# Patient Record
Sex: MALE | Race: WHITE | NOT HISPANIC OR LATINO
[De-identification: names, ages, dates, MRNs, and addresses within clinical notes are randomized per-mention and may not be internally consistent; named-entity substitution may affect disease eponyms.]

---

## 2017-01-11 ENCOUNTER — MEDICATION RENEWAL (OUTPATIENT)
Age: 82
End: 2017-01-11

## 2017-01-12 ENCOUNTER — APPOINTMENT (OUTPATIENT)
Dept: OPHTHALMOLOGY | Facility: CLINIC | Age: 82
End: 2017-01-12

## 2017-01-25 ENCOUNTER — MEDICATION RENEWAL (OUTPATIENT)
Age: 82
End: 2017-01-25

## 2017-04-13 ENCOUNTER — APPOINTMENT (OUTPATIENT)
Dept: OPHTHALMOLOGY | Facility: CLINIC | Age: 82
End: 2017-04-13

## 2017-04-17 ENCOUNTER — APPOINTMENT (OUTPATIENT)
Dept: INTERNAL MEDICINE | Facility: CLINIC | Age: 82
End: 2017-04-17

## 2017-04-17 VITALS
OXYGEN SATURATION: 93 % | TEMPERATURE: 97.9 F | WEIGHT: 182 LBS | HEART RATE: 73 BPM | HEIGHT: 67 IN | BODY MASS INDEX: 28.56 KG/M2

## 2017-04-17 DIAGNOSIS — S67.192A CRUSHING INJURY OF RIGHT MIDDLE FINGER, INITIAL ENCOUNTER: ICD-10-CM

## 2017-04-17 DIAGNOSIS — S67.194A: ICD-10-CM

## 2017-04-17 DIAGNOSIS — S62.634A DISPLACED FRACTURE OF DISTAL PHALANX OF RIGHT RING FINGER, INITIAL ENCOUNTER FOR CLOSED FRACTURE: ICD-10-CM

## 2017-04-17 DIAGNOSIS — S62.632A DISPLACED FRACTURE OF DISTAL PHALANX OF RIGHT MIDDLE FINGER, INITIAL ENCOUNTER FOR CLOSED FRACTURE: ICD-10-CM

## 2017-04-17 LAB
CREAT SPEC-SCNC: NORMAL
GLUCOSE UR-MCNC: NORMAL
HGB UR QL STRIP.AUTO: NORMAL
KETONES UR-MCNC: NORMAL
LEUKOCYTE ESTERASE UR QL STRIP: NORMAL
NITRITE UR QL STRIP: NORMAL
PH UR STRIP: 7
PROT UR STRIP-MCNC: NORMAL
SP GR UR STRIP: 1.02

## 2017-04-18 ENCOUNTER — TRANSCRIPTION ENCOUNTER (OUTPATIENT)
Age: 82
End: 2017-04-18

## 2017-06-14 ENCOUNTER — MEDICATION RENEWAL (OUTPATIENT)
Age: 82
End: 2017-06-14

## 2017-08-03 ENCOUNTER — APPOINTMENT (OUTPATIENT)
Dept: OPHTHALMOLOGY | Facility: CLINIC | Age: 82
End: 2017-08-03
Payer: MEDICARE

## 2017-08-03 PROCEDURE — 92012 INTRM OPH EXAM EST PATIENT: CPT

## 2017-11-16 ENCOUNTER — RX RENEWAL (OUTPATIENT)
Age: 82
End: 2017-11-16

## 2017-11-16 ENCOUNTER — APPOINTMENT (OUTPATIENT)
Dept: OPHTHALMOLOGY | Facility: CLINIC | Age: 82
End: 2017-11-16
Payer: MEDICARE

## 2017-11-16 PROCEDURE — 92012 INTRM OPH EXAM EST PATIENT: CPT

## 2017-12-07 ENCOUNTER — APPOINTMENT (OUTPATIENT)
Dept: ORTHOPEDIC SURGERY | Facility: CLINIC | Age: 82
End: 2017-12-07
Payer: MEDICARE

## 2017-12-07 PROCEDURE — 99203 OFFICE O/P NEW LOW 30 MIN: CPT | Mod: 25

## 2017-12-07 PROCEDURE — 20610 DRAIN/INJ JOINT/BURSA W/O US: CPT | Mod: RT

## 2018-02-05 ENCOUNTER — APPOINTMENT (OUTPATIENT)
Dept: ORTHOPEDIC SURGERY | Facility: CLINIC | Age: 83
End: 2018-02-05
Payer: MEDICARE

## 2018-02-05 PROCEDURE — 20610 DRAIN/INJ JOINT/BURSA W/O US: CPT | Mod: RT

## 2018-02-05 PROCEDURE — 99214 OFFICE O/P EST MOD 30 MIN: CPT | Mod: 25

## 2018-03-15 ENCOUNTER — APPOINTMENT (OUTPATIENT)
Dept: OPHTHALMOLOGY | Facility: CLINIC | Age: 83
End: 2018-03-15
Payer: MEDICARE

## 2018-03-15 DIAGNOSIS — H35.371 PUCKERING OF MACULA, RIGHT EYE: ICD-10-CM

## 2018-03-15 PROCEDURE — 92134 CPTRZ OPH DX IMG PST SGM RTA: CPT

## 2018-03-15 PROCEDURE — 92014 COMPRE OPH EXAM EST PT 1/>: CPT

## 2018-03-15 PROCEDURE — 92285 EXTERNAL OCULAR PHOTOGRAPHY: CPT

## 2018-03-15 PROCEDURE — 92286 ANT SGM IMG I&R SPECLR MIC: CPT

## 2018-04-23 ENCOUNTER — APPOINTMENT (OUTPATIENT)
Dept: INTERNAL MEDICINE | Facility: CLINIC | Age: 83
End: 2018-04-23
Payer: MEDICARE

## 2018-04-23 VITALS
HEART RATE: 90 BPM | TEMPERATURE: 97.7 F | HEIGHT: 67 IN | OXYGEN SATURATION: 98 % | WEIGHT: 182 LBS | BODY MASS INDEX: 28.56 KG/M2

## 2018-04-23 PROCEDURE — 36415 COLL VENOUS BLD VENIPUNCTURE: CPT

## 2018-04-23 PROCEDURE — G0439: CPT

## 2018-04-23 RX ORDER — TRAZODONE HYDROCHLORIDE 100 MG/1
100 TABLET ORAL
Qty: 90 | Refills: 1 | Status: DISCONTINUED | COMMUNITY
Start: 2017-04-17 | End: 2018-04-23

## 2018-04-30 VITALS — DIASTOLIC BLOOD PRESSURE: 80 MMHG | SYSTOLIC BLOOD PRESSURE: 122 MMHG

## 2018-04-30 LAB
APPEARANCE: CLEAR
BACTERIA: NEGATIVE
BILIRUBIN URINE: NEGATIVE
BLOOD URINE: NEGATIVE
COLOR: YELLOW
GLUCOSE QUALITATIVE U: NEGATIVE MG/DL
HBA1C MFR BLD HPLC: 5.7 %
HYALINE CASTS: 1 /LPF
KETONES URINE: NEGATIVE
LEUKOCYTE ESTERASE URINE: NEGATIVE
MICROSCOPIC-UA: NORMAL
NITRITE URINE: NEGATIVE
PH URINE: 5.5
PROTEIN URINE: NEGATIVE MG/DL
RED BLOOD CELLS URINE: 2 /HPF
SPECIFIC GRAVITY URINE: 1.02
SQUAMOUS EPITHELIAL CELLS: 0 /HPF
UROBILINOGEN URINE: NEGATIVE MG/DL
WHITE BLOOD CELLS URINE: 1 /HPF

## 2018-05-14 ENCOUNTER — FORM ENCOUNTER (OUTPATIENT)
Age: 83
End: 2018-05-14

## 2018-05-15 ENCOUNTER — OUTPATIENT (OUTPATIENT)
Dept: OUTPATIENT SERVICES | Facility: HOSPITAL | Age: 83
LOS: 1 days | End: 2018-05-15
Payer: MEDICARE

## 2018-05-15 ENCOUNTER — APPOINTMENT (OUTPATIENT)
Dept: ULTRASOUND IMAGING | Facility: CLINIC | Age: 83
End: 2018-05-15

## 2018-05-15 DIAGNOSIS — Z86.79 PERSONAL HISTORY OF OTHER DISEASES OF THE CIRCULATORY SYSTEM: ICD-10-CM

## 2018-05-15 PROCEDURE — 93880 EXTRACRANIAL BILAT STUDY: CPT | Mod: 26

## 2018-05-15 PROCEDURE — 93880 EXTRACRANIAL BILAT STUDY: CPT

## 2018-06-14 ENCOUNTER — APPOINTMENT (OUTPATIENT)
Dept: ORTHOPEDIC SURGERY | Facility: CLINIC | Age: 83
End: 2018-06-14
Payer: MEDICARE

## 2018-06-14 DIAGNOSIS — M25.461 EFFUSION, RIGHT KNEE: ICD-10-CM

## 2018-06-14 PROCEDURE — 99213 OFFICE O/P EST LOW 20 MIN: CPT

## 2018-06-25 ENCOUNTER — APPOINTMENT (OUTPATIENT)
Dept: ORTHOPEDIC SURGERY | Facility: CLINIC | Age: 83
End: 2018-06-25

## 2018-06-26 ENCOUNTER — APPOINTMENT (OUTPATIENT)
Dept: ORTHOPEDIC SURGERY | Facility: CLINIC | Age: 83
End: 2018-06-26
Payer: MEDICARE

## 2018-06-26 VITALS
SYSTOLIC BLOOD PRESSURE: 117 MMHG | BODY MASS INDEX: 28.25 KG/M2 | WEIGHT: 180 LBS | HEART RATE: 100 BPM | DIASTOLIC BLOOD PRESSURE: 79 MMHG | HEIGHT: 67 IN

## 2018-06-26 DIAGNOSIS — M43.16 SPONDYLOLISTHESIS, LUMBAR REGION: ICD-10-CM

## 2018-06-26 PROCEDURE — 99204 OFFICE O/P NEW MOD 45 MIN: CPT

## 2018-06-26 PROCEDURE — 72100 X-RAY EXAM L-S SPINE 2/3 VWS: CPT

## 2018-07-19 ENCOUNTER — APPOINTMENT (OUTPATIENT)
Dept: OPHTHALMOLOGY | Facility: CLINIC | Age: 83
End: 2018-07-19

## 2018-08-06 ENCOUNTER — APPOINTMENT (OUTPATIENT)
Dept: OPHTHALMOLOGY | Facility: CLINIC | Age: 83
End: 2018-08-06
Payer: MEDICARE

## 2018-08-06 PROCEDURE — 92012 INTRM OPH EXAM EST PATIENT: CPT

## 2018-08-09 ENCOUNTER — APPOINTMENT (OUTPATIENT)
Dept: ORTHOPEDIC SURGERY | Facility: CLINIC | Age: 83
End: 2018-08-09
Payer: MEDICARE

## 2018-08-09 VITALS — SYSTOLIC BLOOD PRESSURE: 132 MMHG | HEART RATE: 57 BPM | DIASTOLIC BLOOD PRESSURE: 90 MMHG

## 2018-08-09 PROCEDURE — 73564 X-RAY EXAM KNEE 4 OR MORE: CPT | Mod: LT

## 2018-08-09 PROCEDURE — 20610 DRAIN/INJ JOINT/BURSA W/O US: CPT | Mod: LT

## 2018-08-09 PROCEDURE — 99214 OFFICE O/P EST MOD 30 MIN: CPT | Mod: 25

## 2018-09-26 DIAGNOSIS — H91.90 UNSPECIFIED HEARING LOSS, UNSPECIFIED EAR: ICD-10-CM

## 2018-12-10 ENCOUNTER — APPOINTMENT (OUTPATIENT)
Dept: OPHTHALMOLOGY | Facility: CLINIC | Age: 83
End: 2018-12-10
Payer: MEDICARE

## 2018-12-10 DIAGNOSIS — H35.371 PUCKERING OF MACULA, RIGHT EYE: ICD-10-CM

## 2018-12-10 PROCEDURE — 92012 INTRM OPH EXAM EST PATIENT: CPT

## 2018-12-10 PROCEDURE — 76514 ECHO EXAM OF EYE THICKNESS: CPT

## 2019-01-25 ENCOUNTER — TRANSCRIPTION ENCOUNTER (OUTPATIENT)
Age: 84
End: 2019-01-25

## 2019-04-08 ENCOUNTER — RX RENEWAL (OUTPATIENT)
Age: 84
End: 2019-04-08

## 2019-04-08 ENCOUNTER — APPOINTMENT (OUTPATIENT)
Dept: INTERNAL MEDICINE | Facility: CLINIC | Age: 84
End: 2019-04-08
Payer: MEDICARE

## 2019-04-08 VITALS
WEIGHT: 181 LBS | TEMPERATURE: 97.9 F | OXYGEN SATURATION: 98 % | BODY MASS INDEX: 28.41 KG/M2 | HEART RATE: 68 BPM | HEIGHT: 67 IN

## 2019-04-08 VITALS — SYSTOLIC BLOOD PRESSURE: 148 MMHG | DIASTOLIC BLOOD PRESSURE: 86 MMHG

## 2019-04-08 DIAGNOSIS — Z86.2 PERSONAL HISTORY OF DISEASES OF THE BLOOD AND BLOOD-FORMING ORGANS AND CERTAIN DISORDERS INVOLVING THE IMMUNE MECHANISM: ICD-10-CM

## 2019-04-08 DIAGNOSIS — H11.31 CONJUNCTIVAL HEMORRHAGE, RIGHT EYE: ICD-10-CM

## 2019-04-08 DIAGNOSIS — Z86.79 PERSONAL HISTORY OF OTHER DISEASES OF THE CIRCULATORY SYSTEM: ICD-10-CM

## 2019-04-08 PROCEDURE — 36415 COLL VENOUS BLD VENIPUNCTURE: CPT

## 2019-04-08 PROCEDURE — 82270 OCCULT BLOOD FECES: CPT

## 2019-04-08 PROCEDURE — G0444 DEPRESSION SCREEN ANNUAL: CPT | Mod: 59

## 2019-04-08 PROCEDURE — G0439: CPT

## 2019-04-08 RX ORDER — PREDNISOLONE ACETATE 10 MG/ML
1 SUSPENSION/ DROPS OPHTHALMIC DAILY
Qty: 1 | Refills: 1 | Status: DISCONTINUED | COMMUNITY
Start: 2017-11-16 | End: 2019-04-08

## 2019-04-08 NOTE — PHYSICAL EXAM

## 2019-04-08 NOTE — HISTORY OF PRESENT ILLNESS
[FreeTextEntry1] : The patient is here for a routine visit.  [de-identified] : He tries to watch his diet and he exercises.  He sees Dr. Vines and will be returning soon.  No chest pain or dyspnea.\par \par The knee symptoms are improved.\par \par He is having recurrent low back pain.\par \par The tinnitus is the same and chronic.\par \par He has low back reddish recurrent lesions felt to be herpetic which he has had intermittently for years.  Acyclovir cream helps.

## 2019-04-08 NOTE — HEALTH RISK ASSESSMENT
[No falls in past year] : Patient reported no falls in the past year [0] : 2) Feeling down, depressed, or hopeless: Not at all (0) [Fully functional (bathing, dressing, toileting, transferring, walking, feeding)] : Fully functional (bathing, dressing, toileting, transferring, walking, feeding) [Fully functional (using the telephone, shopping, preparing meals, housekeeping, doing laundry, using] : Fully functional and needs no help or supervision to perform IADLs (using the telephone, shopping, preparing meals, housekeeping, doing laundry, using transportation, managing medications and managing finances) [] : No [[id number]] : 0 [Change in mental status noted] : No change in mental status noted [Reports changes in hearing] : Reports no changes in hearing [Reports changes in vision] : Reports no changes in vision [Reports changes in dental health] : Reports no changes in dental health

## 2019-04-08 NOTE — ASSESSMENT
[FreeTextEntry1] : He has no new cardiac symptoms.  Plan as per Dr. Vines.  The BP is a little high now- note Dr. Vines had switched him  to Losartan 12.5 QD at the last visit there.  Will increase the Losartan to 25 mg QD and check the BP at his upcoming visit there.\par \par Check lipids- note he is taking Atorvastatin 20 mg (not 40 mg which had been advised by Dr. Vines).  Consider increasing it.  Discussed diet and exercise.\par \par He will see Dr. Murillo for a routine visit tomorrow.  Defer TFTs today.\par \par The low back rash is herpetic and he can use the Acyclovir cream.  If it becomes more frequent, could consider Valacyclovir po suppression.\par \par S/p Prevnar and Pneumovax.  Shingrix advised when available.  \par \par Colonoscopy 12/10- no need to repeat now.  \par \par Carotid dopplers showed mild disease in 5/18.  He can do a follow-up doppler in the next few months.\par \par He will see a spine orthopedist for his back.  \par \par Alprazolam renewed for the tinnitus.

## 2019-05-02 LAB
25(OH)D3 SERPL-MCNC: 48.8 NG/ML
ALBUMIN SERPL ELPH-MCNC: 4.4 G/DL
ALP BLD-CCNC: 90 U/L
ALT SERPL-CCNC: 32 U/L
ANION GAP SERPL CALC-SCNC: 13 MMOL/L
APPEARANCE: CLEAR
AST SERPL-CCNC: 33 U/L
BACTERIA: NEGATIVE
BASOPHILS # BLD AUTO: 0.03 K/UL
BASOPHILS NFR BLD AUTO: 0.5 %
BILIRUB SERPL-MCNC: 1.5 MG/DL
BILIRUBIN URINE: NEGATIVE
BLOOD URINE: NEGATIVE
BUN SERPL-MCNC: 20 MG/DL
CALCIUM SERPL-MCNC: 9.3 MG/DL
CHLORIDE SERPL-SCNC: 103 MMOL/L
CHOLEST SERPL-MCNC: 121 MG/DL
CHOLEST/HDLC SERPL: 2.8 RATIO
CO2 SERPL-SCNC: 28 MMOL/L
COLOR: NORMAL
CREAT SERPL-MCNC: 1.06 MG/DL
EOSINOPHIL # BLD AUTO: 0.12 K/UL
EOSINOPHIL NFR BLD AUTO: 1.9 %
ESTIMATED AVERAGE GLUCOSE: 117 MG/DL
GLUCOSE QUALITATIVE U: NEGATIVE
GLUCOSE SERPL-MCNC: 107 MG/DL
HBA1C MFR BLD HPLC: 5.7 %
HCT VFR BLD CALC: 46 %
HDLC SERPL-MCNC: 44 MG/DL
HGB BLD-MCNC: 15.1 G/DL
HYALINE CASTS: 0 /LPF
IMM GRANULOCYTES NFR BLD AUTO: 0.2 %
KETONES URINE: NEGATIVE
LDLC SERPL CALC-MCNC: 60 MG/DL
LEUKOCYTE ESTERASE URINE: NEGATIVE
LYMPHOCYTES # BLD AUTO: 1.83 K/UL
LYMPHOCYTES NFR BLD AUTO: 29.1 %
MAN DIFF?: NORMAL
MCHC RBC-ENTMCNC: 31.1 PG
MCHC RBC-ENTMCNC: 32.8 GM/DL
MCV RBC AUTO: 94.7 FL
MICROSCOPIC-UA: NORMAL
MONOCYTES # BLD AUTO: 0.41 K/UL
MONOCYTES NFR BLD AUTO: 6.5 %
NEUTROPHILS # BLD AUTO: 3.89 K/UL
NEUTROPHILS NFR BLD AUTO: 61.8 %
NITRITE URINE: NEGATIVE
PH URINE: 6.5
PLATELET # BLD AUTO: 134 K/UL
POTASSIUM SERPL-SCNC: 3.6 MMOL/L
PROT SERPL-MCNC: 6.9 G/DL
PROTEIN URINE: NORMAL
RBC # BLD: 4.86 M/UL
RBC # FLD: 13.9 %
RED BLOOD CELLS URINE: 1 /HPF
SODIUM SERPL-SCNC: 144 MMOL/L
SPECIFIC GRAVITY URINE: 1.02
SQUAMOUS EPITHELIAL CELLS: 0 /HPF
TRIGL SERPL-MCNC: 84 MG/DL
UROBILINOGEN URINE: NORMAL
WBC # FLD AUTO: 6.29 K/UL
WHITE BLOOD CELLS URINE: 0 /HPF

## 2019-05-16 ENCOUNTER — APPOINTMENT (OUTPATIENT)
Dept: OPHTHALMOLOGY | Facility: CLINIC | Age: 84
End: 2019-05-16
Payer: MEDICARE

## 2019-05-16 DIAGNOSIS — Z94.7 CORNEAL TRANSPLANT STATUS: ICD-10-CM

## 2019-05-16 DIAGNOSIS — H26.9 UNSPECIFIED CATARACT: ICD-10-CM

## 2019-05-16 DIAGNOSIS — H18.51 ENDOTHELIAL CORNEAL DYSTROPHY: ICD-10-CM

## 2019-05-16 DIAGNOSIS — Z98.41 CATARACT EXTRACTION STATUS, RIGHT EYE: ICD-10-CM

## 2019-05-16 PROCEDURE — 76514 ECHO EXAM OF EYE THICKNESS: CPT

## 2019-05-16 PROCEDURE — 92012 INTRM OPH EXAM EST PATIENT: CPT | Mod: 25

## 2019-07-30 ENCOUNTER — APPOINTMENT (OUTPATIENT)
Dept: SPINE | Facility: CLINIC | Age: 84
End: 2019-07-30
Payer: MEDICARE

## 2019-07-30 ENCOUNTER — MEDICATION RENEWAL (OUTPATIENT)
Age: 84
End: 2019-07-30

## 2019-07-30 VITALS
BODY MASS INDEX: 28.25 KG/M2 | HEIGHT: 67 IN | HEART RATE: 70 BPM | WEIGHT: 180 LBS | DIASTOLIC BLOOD PRESSURE: 87 MMHG | SYSTOLIC BLOOD PRESSURE: 167 MMHG

## 2019-07-30 DIAGNOSIS — Z78.9 OTHER SPECIFIED HEALTH STATUS: ICD-10-CM

## 2019-07-30 PROCEDURE — 99204 OFFICE O/P NEW MOD 45 MIN: CPT

## 2019-07-30 RX ORDER — PREDNISOLONE ACETATE 10 MG/ML
1 SUSPENSION/ DROPS OPHTHALMIC DAILY
Qty: 10 | Refills: 2 | Status: DISCONTINUED | COMMUNITY
Start: 2018-03-15 | End: 2019-07-30

## 2019-08-01 ENCOUNTER — APPOINTMENT (OUTPATIENT)
Dept: INTERNAL MEDICINE | Facility: CLINIC | Age: 84
End: 2019-08-01
Payer: MEDICARE

## 2019-08-01 VITALS
HEIGHT: 67 IN | BODY MASS INDEX: 28.25 KG/M2 | OXYGEN SATURATION: 99 % | WEIGHT: 180 LBS | TEMPERATURE: 97.9 F | HEART RATE: 98 BPM

## 2019-08-01 PROCEDURE — 99213 OFFICE O/P EST LOW 20 MIN: CPT

## 2019-08-02 NOTE — HISTORY OF PRESENT ILLNESS
[de-identified] : The patient has had mild symptoms for up to a year, more prominent in the last three months and now getting worse.  He has a loss of strength in his legs which was worse on a recent vacation.  There are episodes in which his legs buckle.  He has a loss of balance because he "doesn't know where my feet are."  He has a numb and tingling feeling of his lower legs.  It is a "pins and needles".  He can lose his balance.  \par \par He saw Dr. Roberts and an MRI lumbar spine showed severe spinal stenosis and he was told that he needs surgery.  He has already seen a neurosurgeon and surgery was advised.

## 2019-08-02 NOTE — ASSESSMENT
[FreeTextEntry1] : The patient has severe spinal stenosis and he has been told he needs surgery.  The patient agrees and he plans to get another opinion at [facility] and then decide on a surgeon.  He was counseled and he was advised to proceed quickly given the severity of disease.  \par \par He was also advised to see his cardiologist, Dr. Baltazar in preparation for surgery.  We discussed the need to hold anticoagulation for surgery and the patient should set up management with his cardiologist.

## 2019-08-05 NOTE — REASON FOR VISIT
[Spouse] : spouse [FreeTextEntry1] : 83 year old male who presents with a 3 month history of left leg numbness and no pain.  He has balance issues and no falls.  His balance issue are progressing and worrisome to the patient.  He reports being physically limited by lower extremity numbness and has impacted the performance of his daily activity levels.  He reports no pain and no weakness, no bowel or bladder issues.

## 2019-08-05 NOTE — ASSESSMENT
[FreeTextEntry1] : Assess;\par Numbness of left leg \par MRI shows severe stenosis  L3 \par \par PLAN:\par Surgery suggested but discussed pain will develop\par Recommended conservative treatments\par PT, aqua therapy recommended\par Return if symptoms worsen or weakness occurs

## 2019-08-05 NOTE — REVIEW OF SYSTEMS
[Negative] : Heme/Lymph [de-identified] : left leg numbness [FreeTextEntry1] : no bowel or bladder issues

## 2019-09-13 ENCOUNTER — APPOINTMENT (OUTPATIENT)
Dept: VASCULAR SURGERY | Facility: CLINIC | Age: 84
End: 2019-09-13
Payer: MEDICARE

## 2019-09-13 VITALS
TEMPERATURE: 97.9 F | BODY MASS INDEX: 28.41 KG/M2 | HEIGHT: 67 IN | DIASTOLIC BLOOD PRESSURE: 89 MMHG | HEART RATE: 72 BPM | WEIGHT: 181 LBS | SYSTOLIC BLOOD PRESSURE: 140 MMHG

## 2019-09-13 PROCEDURE — 99203 OFFICE O/P NEW LOW 30 MIN: CPT

## 2019-10-08 ENCOUNTER — APPOINTMENT (OUTPATIENT)
Dept: INTERNAL MEDICINE | Facility: CLINIC | Age: 84
End: 2019-10-08
Payer: MEDICARE

## 2019-10-08 VITALS
HEIGHT: 67 IN | TEMPERATURE: 98.5 F | BODY MASS INDEX: 28.25 KG/M2 | HEART RATE: 95 BPM | OXYGEN SATURATION: 98 % | WEIGHT: 180 LBS

## 2019-10-08 VITALS — HEART RATE: 94 BPM | DIASTOLIC BLOOD PRESSURE: 90 MMHG | SYSTOLIC BLOOD PRESSURE: 142 MMHG

## 2019-10-08 PROCEDURE — 90662 IIV NO PRSV INCREASED AG IM: CPT

## 2019-10-08 PROCEDURE — 99213 OFFICE O/P EST LOW 20 MIN: CPT | Mod: 25

## 2019-10-08 PROCEDURE — G0008: CPT

## 2019-10-08 NOTE — PHYSICAL EXAM
[Normal Rate] : normal rate  [Normal S1, S2] : normal S1 and S2 [Normal] : no carotid or abdominal bruits heard, no varicosities, pedal pulses are present, no peripheral edema, no extremity clubbing or cyanosis and no palpable aorta [de-identified] : irregular rhythm

## 2019-10-08 NOTE — HISTORY OF PRESENT ILLNESS
[de-identified] : The patient comes in because the BP has been running high.  He has had other MD visits and it can be 140-150/100.  His diet has been ok and he is active.

## 2019-10-08 NOTE — ASSESSMENT
[FreeTextEntry1] : The blood pressure is elevated at 140/90.  It has been a little high elsewhere.  Increase the Losartan from 12.5 mg to 50 mg and return in 3-4 weeks to recheck.  Discussed diet and exercise.  Note he is also probably in Afib with a rate in the 90s and I advised him to ask his cardiologist about increasing the Metoprolol.\par \par He had an injection for his back and he is feeling much better so he decided to defer surgery.\par \par Flu vaccine today.

## 2019-10-16 ENCOUNTER — MEDICATION RENEWAL (OUTPATIENT)
Age: 84
End: 2019-10-16

## 2019-10-17 ENCOUNTER — APPOINTMENT (OUTPATIENT)
Dept: OPHTHALMOLOGY | Facility: CLINIC | Age: 84
End: 2019-10-17
Payer: MEDICARE

## 2019-10-17 ENCOUNTER — NON-APPOINTMENT (OUTPATIENT)
Age: 84
End: 2019-10-17

## 2019-10-17 PROCEDURE — 92012 INTRM OPH EXAM EST PATIENT: CPT

## 2019-10-19 DIAGNOSIS — B00.1 HERPESVIRAL VESICULAR DERMATITIS: ICD-10-CM

## 2019-11-11 ENCOUNTER — APPOINTMENT (OUTPATIENT)
Dept: INTERNAL MEDICINE | Facility: CLINIC | Age: 84
End: 2019-11-11

## 2020-01-02 ENCOUNTER — MEDICATION RENEWAL (OUTPATIENT)
Age: 85
End: 2020-01-02

## 2020-02-27 ENCOUNTER — APPOINTMENT (OUTPATIENT)
Dept: OPHTHALMOLOGY | Facility: CLINIC | Age: 85
End: 2020-02-27
Payer: MEDICARE

## 2020-02-27 ENCOUNTER — NON-APPOINTMENT (OUTPATIENT)
Age: 85
End: 2020-02-27

## 2020-02-27 PROCEDURE — 92012 INTRM OPH EXAM EST PATIENT: CPT

## 2020-03-12 ENCOUNTER — RX RENEWAL (OUTPATIENT)
Age: 85
End: 2020-03-12

## 2020-06-29 ENCOUNTER — APPOINTMENT (OUTPATIENT)
Dept: OPHTHALMOLOGY | Facility: CLINIC | Age: 85
End: 2020-06-29
Payer: MEDICARE

## 2020-06-29 ENCOUNTER — NON-APPOINTMENT (OUTPATIENT)
Age: 85
End: 2020-06-29

## 2020-06-29 PROCEDURE — 92012 INTRM OPH EXAM EST PATIENT: CPT

## 2020-07-31 ENCOUNTER — APPOINTMENT (OUTPATIENT)
Dept: INTERNAL MEDICINE | Facility: CLINIC | Age: 85
End: 2020-07-31
Payer: MEDICARE

## 2020-07-31 VITALS
OXYGEN SATURATION: 97 % | BODY MASS INDEX: 28.25 KG/M2 | WEIGHT: 180 LBS | TEMPERATURE: 97.8 F | HEIGHT: 67 IN | HEART RATE: 88 BPM

## 2020-07-31 VITALS — DIASTOLIC BLOOD PRESSURE: 85 MMHG | SYSTOLIC BLOOD PRESSURE: 145 MMHG

## 2020-07-31 DIAGNOSIS — M48.061 SPINAL STENOSIS, LUMBAR REGION WITHOUT NEUROGENIC CLAUDICATION: ICD-10-CM

## 2020-07-31 DIAGNOSIS — Z92.29 PERSONAL HISTORY OF OTHER DRUG THERAPY: ICD-10-CM

## 2020-07-31 DIAGNOSIS — M25.561 PAIN IN RIGHT KNEE: ICD-10-CM

## 2020-07-31 PROCEDURE — G0444 DEPRESSION SCREEN ANNUAL: CPT | Mod: 59

## 2020-07-31 PROCEDURE — G0439: CPT

## 2020-07-31 PROCEDURE — 36415 COLL VENOUS BLD VENIPUNCTURE: CPT

## 2020-07-31 PROCEDURE — 82270 OCCULT BLOOD FECES: CPT

## 2020-07-31 NOTE — ASSESSMENT
[FreeTextEntry1] : The BP is a little elevated- increase Losartan to 100 mg QD.  Discussed diet and exercise.  He sees Dr. Barrow.\par \par Check lipids on Atorvastatin.\par \par Check a HgBA1c- last 5.7.\par \par He says carotid dopplers were done recently by Dr. Barrow.\par \par Check a COVID-19 antibody.\par \par Shingrix advised at pharmacy.  S/p Prevnar, Pneumovax.\par \par Defer colonoscopy- last 12/10.\par \par He sees an ophthalmologist.  \par \par We discussed the upcoming lumbar spine surgery.

## 2020-07-31 NOTE — HISTORY OF PRESENT ILLNESS
[FreeTextEntry1] : The patient is here for a routine visit.  [de-identified] : His diet is ok although he gained a few pounds while he was home during COVID-19.  He does some exercise and he walks.\par \par He is now seeing Dr. Barrow as his cardiologist.  No chest pain or dyspnea.  He was there recently.\par \par The back pain is chronic.  He is going for surgery in October.  He had temporary relief from an epidural but the pain is now returning.

## 2020-07-31 NOTE — PHYSICAL EXAM
[No Acute Distress] : no acute distress [Well Nourished] : well nourished [Well-Appearing] : well-appearing [Well Developed] : well developed [Normal Sclera/Conjunctiva] : normal sclera/conjunctiva [PERRL] : pupils equal round and reactive to light [EOMI] : extraocular movements intact [No JVD] : no jugular venous distention [Normal Oropharynx] : the oropharynx was normal [Normal Outer Ear/Nose] : the outer ears and nose were normal in appearance [Supple] : supple [No Lymphadenopathy] : no lymphadenopathy [No Respiratory Distress] : no respiratory distress  [Thyroid Normal, No Nodules] : the thyroid was normal and there were no nodules present [Clear to Auscultation] : lungs were clear to auscultation bilaterally [No Accessory Muscle Use] : no accessory muscle use [Normal Rate] : normal rate  [Regular Rhythm] : with a regular rhythm [Normal S1, S2] : normal S1 and S2 [No Murmur] : no murmur heard [No Carotid Bruits] : no carotid bruits [No Abdominal Bruit] : a ~M bruit was not heard ~T in the abdomen [No Varicosities] : no varicosities [Pedal Pulses Present] : the pedal pulses are present [No Extremity Clubbing/Cyanosis] : no extremity clubbing/cyanosis [No Palpable Aorta] : no palpable aorta [No Edema] : there was no peripheral edema [Non Tender] : non-tender [Soft] : abdomen soft [Non-distended] : non-distended [No HSM] : no HSM [No Masses] : no abdominal mass palpated [Normal Bowel Sounds] : normal bowel sounds [Normal Sphincter Tone] : normal sphincter tone [No Mass] : no mass [Normal Anterior Cervical Nodes] : no anterior cervical lymphadenopathy [Normal Posterior Cervical Nodes] : no posterior cervical lymphadenopathy [No CVA Tenderness] : no CVA  tenderness [No Spinal Tenderness] : no spinal tenderness [No Joint Swelling] : no joint swelling [Grossly Normal Strength/Tone] : grossly normal strength/tone [Coordination Grossly Intact] : coordination grossly intact [No Rash] : no rash [Normal Gait] : normal gait [No Focal Deficits] : no focal deficits [Normal Affect] : the affect was normal [Normal Insight/Judgement] : insight and judgment were intact [de-identified] : irregularly irregular [Stool Occult Blood] : stool negative for occult blood

## 2020-07-31 NOTE — HEALTH RISK ASSESSMENT
[No] : No [No falls in past year] : Patient reported no falls in the past year [0] : 2) Feeling down, depressed, or hopeless: Not at all (0) [Fully functional (bathing, dressing, toileting, transferring, walking, feeding)] : Fully functional (bathing, dressing, toileting, transferring, walking, feeding) [Fully functional (using the telephone, shopping, preparing meals, housekeeping, doing laundry, using] : Fully functional and needs no help or supervision to perform IADLs (using the telephone, shopping, preparing meals, housekeeping, doing laundry, using transportation, managing medications and managing finances) [] : No [Reports changes in hearing] : Reports no changes in hearing [[id number]] : 0 [Reports changes in dental health] : Reports no changes in dental health [Reports changes in vision] : Reports no changes in vision

## 2020-09-11 ENCOUNTER — APPOINTMENT (OUTPATIENT)
Dept: VASCULAR SURGERY | Facility: CLINIC | Age: 85
End: 2020-09-11

## 2020-10-28 LAB
25(OH)D3 SERPL-MCNC: 42 NG/ML
ALBUMIN SERPL ELPH-MCNC: 4.5 G/DL
ALP BLD-CCNC: 77 U/L
ALT SERPL-CCNC: 32 U/L
ANION GAP SERPL CALC-SCNC: 15 MMOL/L
APPEARANCE: CLEAR
AST SERPL-CCNC: 33 U/L
BACTERIA: NEGATIVE
BASOPHILS # BLD AUTO: 0.04 K/UL
BASOPHILS NFR BLD AUTO: 0.5 %
BILIRUB SERPL-MCNC: 0.8 MG/DL
BILIRUBIN URINE: NEGATIVE
BLOOD URINE: NEGATIVE
BUN SERPL-MCNC: 27 MG/DL
CALCIUM SERPL-MCNC: 9.8 MG/DL
CHLORIDE SERPL-SCNC: 102 MMOL/L
CHOLEST SERPL-MCNC: 136 MG/DL
CHOLEST/HDLC SERPL: 2.6 RATIO
CO2 SERPL-SCNC: 26 MMOL/L
COLOR: NORMAL
CREAT SERPL-MCNC: 1.19 MG/DL
EOSINOPHIL # BLD AUTO: 0.14 K/UL
EOSINOPHIL NFR BLD AUTO: 1.8 %
ESTIMATED AVERAGE GLUCOSE: 117 MG/DL
GLUCOSE QUALITATIVE U: NEGATIVE
GLUCOSE SERPL-MCNC: 97 MG/DL
HBA1C MFR BLD HPLC: 5.7 %
HCT VFR BLD CALC: 47.6 %
HDLC SERPL-MCNC: 52 MG/DL
HGB BLD-MCNC: 15.2 G/DL
HYALINE CASTS: 1 /LPF
IMM GRANULOCYTES NFR BLD AUTO: 0.4 %
KETONES URINE: NEGATIVE
LDLC SERPL CALC-MCNC: 69 MG/DL
LEUKOCYTE ESTERASE URINE: NEGATIVE
LYMPHOCYTES # BLD AUTO: 2.2 K/UL
LYMPHOCYTES NFR BLD AUTO: 27.8 %
MAN DIFF?: NORMAL
MCHC RBC-ENTMCNC: 31 PG
MCHC RBC-ENTMCNC: 31.9 GM/DL
MCV RBC AUTO: 96.9 FL
MICROSCOPIC-UA: NORMAL
MONOCYTES # BLD AUTO: 0.43 K/UL
MONOCYTES NFR BLD AUTO: 5.4 %
NEUTROPHILS # BLD AUTO: 5.07 K/UL
NEUTROPHILS NFR BLD AUTO: 64.1 %
NITRITE URINE: NEGATIVE
PH URINE: 6.5
PLATELET # BLD AUTO: 146 K/UL
POTASSIUM SERPL-SCNC: 4.2 MMOL/L
PROT SERPL-MCNC: 6.7 G/DL
PROTEIN URINE: NORMAL
RBC # BLD: 4.91 M/UL
RBC # FLD: 14 %
RED BLOOD CELLS URINE: 0 /HPF
SARS-COV-2 IGG SERPL IA-ACNC: <0.1 INDEX
SARS-COV-2 IGG SERPL QL IA: NEGATIVE
SODIUM SERPL-SCNC: 143 MMOL/L
SPECIFIC GRAVITY URINE: 1.02
SQUAMOUS EPITHELIAL CELLS: 0 /HPF
T4 FREE SERPL-MCNC: 1.4 NG/DL
TRIGL SERPL-MCNC: 77 MG/DL
TSH SERPL-ACNC: 2.46 UIU/ML
UROBILINOGEN URINE: NORMAL
WBC # FLD AUTO: 7.91 K/UL
WHITE BLOOD CELLS URINE: 0 /HPF

## 2021-03-22 ENCOUNTER — APPOINTMENT (OUTPATIENT)
Dept: ORTHOPEDIC SURGERY | Facility: CLINIC | Age: 86
End: 2021-03-22
Payer: MEDICARE

## 2021-03-22 VITALS
WEIGHT: 181 LBS | BODY MASS INDEX: 28.41 KG/M2 | DIASTOLIC BLOOD PRESSURE: 81 MMHG | SYSTOLIC BLOOD PRESSURE: 152 MMHG | HEIGHT: 67 IN | HEART RATE: 88 BPM

## 2021-03-22 PROCEDURE — 73140 X-RAY EXAM OF FINGER(S): CPT | Mod: F9

## 2021-03-22 PROCEDURE — 99214 OFFICE O/P EST MOD 30 MIN: CPT

## 2021-04-16 ENCOUNTER — APPOINTMENT (OUTPATIENT)
Dept: OPHTHALMOLOGY | Facility: CLINIC | Age: 86
End: 2021-04-16
Payer: MEDICARE

## 2021-04-16 ENCOUNTER — NON-APPOINTMENT (OUTPATIENT)
Age: 86
End: 2021-04-16

## 2021-04-16 PROCEDURE — 92012 INTRM OPH EXAM EST PATIENT: CPT

## 2021-05-10 ENCOUNTER — APPOINTMENT (OUTPATIENT)
Dept: ORTHOPEDIC SURGERY | Facility: CLINIC | Age: 86
End: 2021-05-10
Payer: MEDICARE

## 2021-05-10 DIAGNOSIS — M19.049 PRIMARY OSTEOARTHRITIS, UNSPECIFIED HAND: ICD-10-CM

## 2021-05-10 PROCEDURE — 99214 OFFICE O/P EST MOD 30 MIN: CPT

## 2021-07-09 ENCOUNTER — APPOINTMENT (OUTPATIENT)
Dept: INTERNAL MEDICINE | Facility: CLINIC | Age: 86
End: 2021-07-09
Payer: MEDICARE

## 2021-07-09 VITALS
WEIGHT: 176 LBS | OXYGEN SATURATION: 97 % | TEMPERATURE: 97.6 F | HEIGHT: 67 IN | HEART RATE: 89 BPM | BODY MASS INDEX: 27.62 KG/M2

## 2021-07-09 VITALS — DIASTOLIC BLOOD PRESSURE: 90 MMHG | SYSTOLIC BLOOD PRESSURE: 142 MMHG

## 2021-07-09 DIAGNOSIS — Z86.79 PERSONAL HISTORY OF OTHER DISEASES OF THE CIRCULATORY SYSTEM: ICD-10-CM

## 2021-07-09 DIAGNOSIS — Z85.850 PERSONAL HISTORY OF MALIGNANT NEOPLASM OF THYROID: ICD-10-CM

## 2021-07-09 PROCEDURE — 36415 COLL VENOUS BLD VENIPUNCTURE: CPT

## 2021-07-09 PROCEDURE — G0444 DEPRESSION SCREEN ANNUAL: CPT | Mod: 59

## 2021-07-09 PROCEDURE — 82270 OCCULT BLOOD FECES: CPT

## 2021-07-09 PROCEDURE — G0439: CPT

## 2021-07-09 RX ORDER — CALCITRIOL 0.5 UG/1
0.5 CAPSULE, LIQUID FILLED ORAL
Refills: 0 | Status: ACTIVE | COMMUNITY

## 2021-07-09 RX ORDER — VALACYCLOVIR 1 G/1
1 TABLET, FILM COATED ORAL
Qty: 30 | Refills: 1 | Status: DISCONTINUED | COMMUNITY
Start: 2019-10-19 | End: 2021-07-09

## 2021-07-09 NOTE — HISTORY OF PRESENT ILLNESS
[FreeTextEntry1] : The patient is here for a routine visit.  [de-identified] : He remains active. He exercises regularly and he denies chest pain or dyspnea.  His diet is healthy and he has lost a few pounds.  \par \par The tinnitus is chronic and the same.\par \par He has pains and stiffness all over, especially with getting out of the car or if he hasn't been moving for awhile.  No specific redness or swelling. He has pains in the B/L hips, knees and all over.\par \par His back is a lot better after the surgery.

## 2021-07-09 NOTE — HEALTH RISK ASSESSMENT
[No] : No [No falls in past year] : Patient reported no falls in the past year [0] : 2) Feeling down, depressed, or hopeless: Not at all (0) [Fully functional (bathing, dressing, toileting, transferring, walking, feeding)] : Fully functional (bathing, dressing, toileting, transferring, walking, feeding) [Fully functional (using the telephone, shopping, preparing meals, housekeeping, doing laundry, using] : Fully functional and needs no help or supervision to perform IADLs (using the telephone, shopping, preparing meals, housekeeping, doing laundry, using transportation, managing medications and managing finances) [] : No [[id number]] : 0 [Reports changes in hearing] : Reports no changes in hearing [Reports changes in vision] : Reports no changes in vision [Reports changes in dental health] : Reports no changes in dental health

## 2021-07-09 NOTE — ASSESSMENT
[FreeTextEntry1] : The BP is slightly elevated at 142/90.  The Losartan had been increased last year to 100 mg but he went back to 50 mg because he had dizziness when he bent over.  He possibly had mild orthostatic symptoms so he can continue the Losartan 50 mg for now.  \par \par Check lipids on Atorvastatin.  Discussed diet and exercise.\par \par He plans to see a new cardiologist, Dr. Lisker, for a routine visit next month.\par \par He sees his endocrinologist, Dr. Murillo, every six months.\par \par He sees an ophthalmologist and he might need cataract surgery.\par \par He has general aches and stiffness. Will check a CPK, ESR and CRP.\par \par He uses Flonase for AM nasal congestion which he can continue.  He could see an ENT if bothersome.\par \par He has had the COVID-19 vaccine.\par \par S/p Prevnar and Pneumovax.  Shingrix advised at pharmacy.\par \par Colonoscopy 12/10- defer due to his age.  \par \par Alprazolam renewed for tinnitus.

## 2021-07-28 ENCOUNTER — NON-APPOINTMENT (OUTPATIENT)
Age: 86
End: 2021-07-28

## 2021-07-29 ENCOUNTER — APPOINTMENT (OUTPATIENT)
Dept: ORTHOPEDIC SURGERY | Facility: CLINIC | Age: 86
End: 2021-07-29
Payer: MEDICARE

## 2021-07-29 PROCEDURE — 99214 OFFICE O/P EST MOD 30 MIN: CPT | Mod: 25

## 2021-07-29 PROCEDURE — 20610 DRAIN/INJ JOINT/BURSA W/O US: CPT | Mod: LT

## 2021-07-30 NOTE — PROCEDURE
[de-identified] : Injection: Left knee joint.\par Indication: Osteoarthritis. \par \par A discussion was had with the patient regarding this procedure and all questions were answered. All risks, benefits and alternatives were discussed. These included but were not limited to bleeding, infection, and allergic reaction. Alcohol was used to clean the skin, and Betadine was used to sterilize and prep the area in the superolateral aspect of the left knee. Ethyl chloride spray was then used as a topical anesthetic. A 21-gauge needle was used to inject 4cc of 1% lidocaine and 1cc of 40mg/ml methylprednisolone into the knee. A sterile bandage was then applied. The patient tolerated the procedure well and there were no complications.

## 2021-07-30 NOTE — ADDENDUM
[FreeTextEntry1] : This note was written by Viktoria Gibson on 07/29/2021 acting solely as a scribe for Dr. Brian Gaona.\par \par All medical record entries made by the Scribe were at my, Dr. Brian Gaona, direction and personally dictated by me on 07/29/2021. I have personally reviewed the chart and agree that the record accurately reflects my personal performance of the history, physical exam, assessment and plan.

## 2021-07-30 NOTE — DISCUSSION/SUMMARY
[de-identified] : 85-year-old male with left knee osteoarthritis\par \par Patient presents for exacerbation left knee pain consistent with degenerative arthrosis. Patient has trace effusion on clinical examination, and prior treatment for right sided symptoms included aspiration and injection with subsequent resolution of pain. Considering the patient's symptoms, recommendations were made for injection with relative rest and activity modification.  Injection therapy provided today to the left knee under sterile conditions and tolerated well.\par \par Recommendation: Rest, ice, NSAIDs.  Activity restriction until symptoms resolve.\par \par Followup p.r.n.

## 2021-07-30 NOTE — HISTORY OF PRESENT ILLNESS
[de-identified] : 85 year old male seen previously for left knee OA presents today with left knee pain x 1 month. No injury reported. The pain is constant worse with stair usage and bending to  something off the floor. Patient taking Coumadin unable to take NSAIDs. He is here for possible cortisone injection he received one in 2018 was helpful until recently. Denies buckling, catching, numbness or tingling.

## 2021-07-30 NOTE — PHYSICAL EXAM
[de-identified] : General: well-appearing, not in acute distress and not obese. \par Gait: normal. \par Oriented to time, place, person\par Mood: Normal\par Affect: Normal\par \par Left knee exam\par \par Skin: Clean, dry, intact\par Inspection: No obvious malalignment, no masses, no swelling, trace effusion\par Pulses: 2+ DP/PT pulses\par ROM: 0-125 degrees of flexion. + pain with deep knee flexion.\par Tenderness: Mild MJLT. Mild LJLT. Mild pain over the patella facets. Mild pain to the quadriceps tendon. No pain to the patella tendon. No posterior knee tenderness.\par Stability: Stable to varus, valgus. Negative lachman testing. Negative anterior drawer, negative posterior drawer.\par Strength: 5/5 Q/H/TA/GS/EHL, without atrophy\par Neuro: In tact to light touch throughout, DTR's normal\par Additional tests: Positive McMurrays test, Negative patellar grind test.  [de-identified] : The following radiographs were ordered and read by me during this patients visit. I reviewed each radiograph in detail with the patient and discussed the findings as highlighted below. \par \par 4 views of the left knee were obtained today that show no acute fracture or dislocation. There is mild medial, no lateral and mild patellofemoral degenerative change seen. There is no significant malalignment. No significant other obvious osseous abnormality, otherwise unremarkable.

## 2021-08-04 ENCOUNTER — NON-APPOINTMENT (OUTPATIENT)
Age: 86
End: 2021-08-04

## 2021-08-09 ENCOUNTER — APPOINTMENT (OUTPATIENT)
Dept: ORTHOPEDIC SURGERY | Facility: CLINIC | Age: 86
End: 2021-08-09
Payer: MEDICARE

## 2021-08-09 PROCEDURE — 99214 OFFICE O/P EST MOD 30 MIN: CPT | Mod: 25

## 2021-08-09 PROCEDURE — 20610 DRAIN/INJ JOINT/BURSA W/O US: CPT | Mod: LT

## 2021-08-12 ENCOUNTER — NON-APPOINTMENT (OUTPATIENT)
Age: 86
End: 2021-08-12

## 2021-08-13 ENCOUNTER — NON-APPOINTMENT (OUTPATIENT)
Age: 86
End: 2021-08-13

## 2021-08-13 ENCOUNTER — APPOINTMENT (OUTPATIENT)
Dept: CARDIOLOGY | Facility: CLINIC | Age: 86
End: 2021-08-13
Payer: MEDICARE

## 2021-08-13 VITALS
BODY MASS INDEX: 27.78 KG/M2 | DIASTOLIC BLOOD PRESSURE: 90 MMHG | WEIGHT: 177 LBS | HEIGHT: 67 IN | SYSTOLIC BLOOD PRESSURE: 146 MMHG | OXYGEN SATURATION: 98 % | HEART RATE: 86 BPM

## 2021-08-13 PROCEDURE — 99205 OFFICE O/P NEW HI 60 MIN: CPT

## 2021-08-13 PROCEDURE — 93000 ELECTROCARDIOGRAM COMPLETE: CPT

## 2021-08-17 NOTE — PHYSICAL EXAM
[de-identified] : 4 views of the left knee were obtained 7.29.21 that show no acute fracture or dislocation. There is mild medial, no lateral and mild patellofemoral degenerative change seen. There is no significant malalignment. No significant other obvious osseous abnormality, otherwise unremarkable.  [de-identified] : General: well-appearing, not in acute distress and not obese. \par Gait: normal. \par Oriented to time, place, person\par Mood: Normal\par Affect: Normal\par \par Left knee exam\par \par Skin: Clean, dry, intact\par Inspection: No obvious malalignment, no masses, no swelling, mild effusion\par Pulses: 2+ DP/PT pulses\par ROM: 0-125 degrees of flexion. + pain with deep knee flexion.\par Tenderness: Mild MJLT. Mild LJLT. Mild pain over the patella facets. Mild pain to the quadriceps tendon. No pain to the patella tendon. No posterior knee tenderness.\par Stability: Stable to varus, valgus. Negative lachman testing. Negative anterior drawer, negative posterior drawer.\par Strength: 5/5 Q/H/TA/GS/EHL, without atrophy\par Neuro: In tact to light touch throughout, DTR's normal\par Additional tests: Positive McMurrays test, Negative patellar grind test.

## 2021-08-17 NOTE — PROCEDURE
[de-identified] : Aspiration: Left knee joint.\par Indication: Effusion.\par \par A discussion was had with the patient regarding this procedure and all questions were answered. All risks, benefits and alternatives were discussed. These included but were not limited to bleeding, infection, and reaccumulation of fluid. Alcohol was used to clean the skin, and betadine was used to sterilize and prep the area in the supero-lateral aspect of the left knee. Ethyl chloride spray was then used as a topical anesthetic. An 18-gauge needle was used to aspirate the knee joint and approximately 35cc of inflammatory fluid was aspirated from the knee without complication. A sterile bandage was then applied. The patient tolerated the procedure well.

## 2021-08-17 NOTE — HISTORY OF PRESENT ILLNESS
[de-identified] : 85 year old male seen previously for left knee OA presents today for follow up of  left knee pain. States that he has difficulty going up stairs and pain with walking. He has developed radiation of pain in to his lower leg. He received Cortisone injection at the last visit which was helpful until he felt a pop couple of days after getting on to his tractor.   Patient taking Coumadin unable to take NSAIDs.  Denies buckling, catching, numbness or tingling.

## 2021-08-17 NOTE — ADDENDUM
[FreeTextEntry1] : This note was written by Viktoria Gibson on 08/09/2021 acting solely as a scribe for Dr. Brian Gaona.\par \par All medical record entries made by the Scribe were at my, Dr. Brian Gaona, direction and personally dictated by me on 08/09/2021. I have personally reviewed the chart and agree that the record accurately reflects my personal performance of the history, physical exam, assessment and plan.

## 2021-08-17 NOTE — DISCUSSION/SUMMARY
[de-identified] : 85-year-old male with left knee osteoarthritis\par \par Patient presents for exacerbation left knee pain consistent with degenerative arthrosis. Patient has moderate effusion on clinical examination, and prior treatment for right sided symptoms included injection with subsequent resolution of pain. Considering the patient's current effusion, recommendations were made for aspiration with continued relative rest and activity modification.  We will wait on advanced imaging as I do not think that this will change current management.\par \par Recommendation: Rest, ice, NSAIDs.  Activity restriction until symptoms resolve.\par \par Followup p.r.n.

## 2021-08-22 NOTE — DISCUSSION/SUMMARY
[FreeTextEntry1] : He is an 85-year-old with a history of chronic atrial fibrillation, hypertension who appears to be in good physical shape otherwise.\par His blood pressure control is not optimal and his heart rate is slightly elevated, therefore I have suggested that he increase his Toprol-XL to 25 mg once daily (he may take this at night as it may improve his sleeping as well).\par We discussed the pros and cons of warfarin versus oral anticoagulation with either Xarelto or Eliquis.\par I believe that a NOAC would be a better option given his labile INR as measured at home once or twice weekly.\par I have suggested Xarelto 20 mg once daily.\par He will begin this after his planned bike ride.\par He will follow-up with me in 1 month's time.\par His wife, Karen, was conferenced in during our conversation and agrees.

## 2021-08-22 NOTE — HISTORY OF PRESENT ILLNESS
[FreeTextEntry1] : Dear Jeferson,\par Thank you for referring him for cardiovascular valuation and management of his cardiac issues.  He is a an 85-year-old with a history of chronic atrial fibrillation, hypercholesterolemia, hypertension, right bundle branch block and arthritis who is physically active and currently rides a motorcycle.  He is planning a 4-day by [facility] in the near future.\par He reports being physically active including being able to walk a couple miles without any chest pains or shortness of breath.  He does up to 500 crutches and 60 push-ups on a routine basis without difficulty.\par His past medical history is notable for thyroid cancer status post resection.\par He has no history of coronary artery disease, diabetes mellitus, smoking or family history of premature coronary artery disease.\par His wife is a patient of ours as well.\par

## 2021-08-22 NOTE — PHYSICAL EXAM
[Well Developed] : well developed [Well Nourished] : well nourished [No Acute Distress] : no acute distress [Normal Conjunctiva] : normal conjunctiva [Normal Venous Pressure] : normal venous pressure [No Murmur] : no murmur [Normal S1, S2] : normal S1, S2 [No Rub] : no rub [No Gallop] : no gallop [Clear Lung Fields] : clear lung fields [Good Air Entry] : good air entry [No Respiratory Distress] : no respiratory distress  [Soft] : abdomen soft [Normal Bowel Sounds] : normal bowel sounds [Normal Gait] : normal gait [No Edema] : no edema [No Cyanosis] : no cyanosis [No Rash] : no rash [Moves all extremities] : moves all extremities [No Focal Deficits] : no focal deficits [Normal Speech] : normal speech [Alert and Oriented] : alert and oriented [Normal memory] : normal memory

## 2021-08-22 NOTE — REVIEW OF SYSTEMS
[SOB] : no shortness of breath [Dyspnea on exertion] : not dyspnea during exertion [Chest Discomfort] : no chest discomfort [Syncope] : no syncope [Negative] : Heme/Lymph

## 2021-08-26 ENCOUNTER — APPOINTMENT (OUTPATIENT)
Dept: ORTHOPEDIC SURGERY | Facility: CLINIC | Age: 86
End: 2021-08-26
Payer: MEDICARE

## 2021-08-26 VITALS — WEIGHT: 175 LBS | HEIGHT: 67 IN | BODY MASS INDEX: 27.47 KG/M2

## 2021-08-26 DIAGNOSIS — M17.12 UNILATERAL PRIMARY OSTEOARTHRITIS, LEFT KNEE: ICD-10-CM

## 2021-08-26 PROCEDURE — 99213 OFFICE O/P EST LOW 20 MIN: CPT

## 2021-09-16 PROBLEM — M17.12 PRIMARY OSTEOARTHRITIS OF LEFT KNEE: Status: ACTIVE | Noted: 2018-08-09

## 2021-09-16 NOTE — HISTORY OF PRESENT ILLNESS
[de-identified] : 85 year old male seen previously for left knee OA presents today for follow up of continued left knee pain/effusion. States that he has difficulty going up stairs and pain with walking. He has developed radiation of pain in to his lower leg. He received Cortisone injection on 7/29 and aspiration on the last visit which was helpful. Patient taking Coumadin unable to take NSAIDs.  Denies buckling, catching, numbness or tingling.

## 2021-09-16 NOTE — ADDENDUM
[FreeTextEntry1] : This note was written by Viktoria Gibson on 08/26/2021 acting solely as a scribe for Dr. Brian Gaona.\par \par All medical record entries made by the Scribe were at my, Dr. Brian Gaona, direction and personally dictated by me on 08/26/2021. I have personally reviewed the chart and agree that the record accurately reflects my personal performance of the history, physical exam, assessment and plan.

## 2021-09-16 NOTE — DISCUSSION/SUMMARY
[de-identified] : 85-year-old male with left knee osteoarthritis\par \par Patient presents follow-up left knee pain consistent with degenerative arthrosis. Patient has mild effusion on clinical examination and symptoms appear to be relatively stable.  We discussed continued management of degenerative knee pain in detail especially given he is unable to take NSAIDs given his anticoagulation status.  I do not think an aspiration is warranted at this time given the mild effusion.  Consideration at this time is for strengthening and conditioning program through physical therapy.\par \par Recommendation: Begin trial of PT, Rx given. Rest, ice, Tylenol as needed.  Activity restriction until symptoms resolve.\par \par Followup p.r.n.

## 2021-09-16 NOTE — PHYSICAL EXAM
[de-identified] : General: well-appearing, not in acute distress and not obese. \par Gait: normal. \par Oriented to time, place, person\par Mood: Normal\par Affect: Normal\par \par Left knee exam\par \par Skin: Clean, dry, intact\par Inspection: No obvious malalignment, no masses, no swelling, mild effusion\par Pulses: 2+ DP/PT pulses\par ROM: 0-125 degrees of flexion. + pain with deep knee flexion.\par Tenderness: Mild MJLT. Mild LJLT. Mild pain over the patella facets. Mild pain to the quadriceps tendon. No pain to the patella tendon. No posterior knee tenderness.\par Stability: Stable to varus, valgus. Negative lachman testing. Negative anterior drawer, negative posterior drawer.\par Strength: 5/5 Q/H/TA/GS/EHL, without atrophy\par Neuro: In tact to light touch throughout, DTR's normal\par Additional tests: Positive McMurrays test, Negative patellar grind test.  [de-identified] : 4 views of the left knee were obtained 7.29.21 that show no acute fracture or dislocation. There is mild medial, no lateral and mild patellofemoral degenerative change seen. There is no significant malalignment. No significant other obvious osseous abnormality, otherwise unremarkable.

## 2021-09-21 ENCOUNTER — APPOINTMENT (OUTPATIENT)
Dept: OPHTHALMOLOGY | Facility: CLINIC | Age: 86
End: 2021-09-21

## 2021-09-24 ENCOUNTER — APPOINTMENT (OUTPATIENT)
Dept: CARDIOLOGY | Facility: CLINIC | Age: 86
End: 2021-09-24
Payer: MEDICARE

## 2021-09-24 ENCOUNTER — NON-APPOINTMENT (OUTPATIENT)
Age: 86
End: 2021-09-24

## 2021-09-24 VITALS
DIASTOLIC BLOOD PRESSURE: 90 MMHG | SYSTOLIC BLOOD PRESSURE: 140 MMHG | HEIGHT: 67 IN | OXYGEN SATURATION: 98 % | WEIGHT: 177 LBS | HEART RATE: 58 BPM | RESPIRATION RATE: 17 BRPM | BODY MASS INDEX: 27.78 KG/M2

## 2021-09-24 PROCEDURE — 99214 OFFICE O/P EST MOD 30 MIN: CPT

## 2021-09-24 PROCEDURE — 93000 ELECTROCARDIOGRAM COMPLETE: CPT

## 2021-09-24 RX ORDER — WARFARIN 6 MG/1
6 TABLET ORAL DAILY
Qty: 30 | Refills: 0 | Status: DISCONTINUED | COMMUNITY
Start: 2021-09-01 | End: 2021-09-24

## 2021-09-24 RX ORDER — CHLORHEXIDINE GLUCONATE, 0.12% ORAL RINSE 1.2 MG/ML
0.12 SOLUTION DENTAL
Qty: 473 | Refills: 0 | Status: DISCONTINUED | COMMUNITY
Start: 2021-09-16

## 2021-09-24 RX ORDER — AMOXICILLIN 500 MG/1
500 TABLET, FILM COATED ORAL
Qty: 9 | Refills: 0 | Status: DISCONTINUED | COMMUNITY
Start: 2021-09-17

## 2021-09-24 NOTE — PHYSICAL EXAM
[Well Developed] : well developed [Well Nourished] : well nourished [No Acute Distress] : no acute distress [Normal Conjunctiva] : normal conjunctiva [Normal Venous Pressure] : normal venous pressure [Normal S1, S2] : normal S1, S2 [No Murmur] : no murmur [No Rub] : no rub [No Gallop] : no gallop [Clear Lung Fields] : clear lung fields [Good Air Entry] : good air entry [No Respiratory Distress] : no respiratory distress  [Soft] : abdomen soft [Normal Bowel Sounds] : normal bowel sounds [Normal Gait] : normal gait [No Edema] : no edema [No Cyanosis] : no cyanosis [No Rash] : no rash [Moves all extremities] : moves all extremities [No Focal Deficits] : no focal deficits [Normal Speech] : normal speech [Alert and Oriented] : alert and oriented [Normal memory] : normal memory

## 2021-09-24 NOTE — DISCUSSION/SUMMARY
[FreeTextEntry1] : He is an 85-year-old with a history of chronic atrial fibrillation, hypertension who appears to be in good physical shape otherwise.\par His blood pressure control is still not optimal and his heart rate is slightly elevated, therefore I have suggested that he continue his Toprol-XL to 25 mg once daily. \par Continue Xarelto 20 mg once daily. If headaches persist will change to eliquis.\par BP followup in 1-2 months.

## 2021-09-24 NOTE — HISTORY OF PRESENT ILLNESS
[FreeTextEntry1] : Morning headeaches for the past 4 days.\par started xarelto 5 days ago. INR was 1.4\par Metoprolol increased to 50 then back to 25.\par Toothaches and surgical treatment.\par no bleeding or bruising.\par \par \par Dear Jeferson,\par Thank you for referring him for cardiovascular valuation and management of his cardiac issues.  He is a an 85-year-old with a history of chronic atrial fibrillation, hypercholesterolemia, hypertension, right bundle branch block and arthritis who is physically active and currently rides a motorcycle.  He is planning a 4-day by [facility] in the near future.\par He reports being physically active including being able to walk a couple miles without any chest pains or shortness of breath.  He does up to 500 crutches and 60 push-ups on a routine basis without difficulty.\par His past medical history is notable for thyroid cancer status post resection.\par He has no history of coronary artery disease, diabetes mellitus, smoking or family history of premature coronary artery disease.\par His wife is a patient of ours as well.\par

## 2021-10-12 ENCOUNTER — APPOINTMENT (OUTPATIENT)
Dept: OPHTHALMOLOGY | Facility: CLINIC | Age: 86
End: 2021-10-12
Payer: MEDICARE

## 2021-10-12 ENCOUNTER — NON-APPOINTMENT (OUTPATIENT)
Age: 86
End: 2021-10-12

## 2021-10-12 PROCEDURE — 92136 OPHTHALMIC BIOMETRY: CPT

## 2021-10-12 PROCEDURE — 92014 COMPRE OPH EXAM EST PT 1/>: CPT

## 2021-10-24 ENCOUNTER — NON-APPOINTMENT (OUTPATIENT)
Age: 86
End: 2021-10-24

## 2021-10-24 LAB
25(OH)D3 SERPL-MCNC: 59.1 NG/ML
ALBUMIN SERPL ELPH-MCNC: 4.2 G/DL
ALP BLD-CCNC: 99 U/L
ALT SERPL-CCNC: 25 U/L
ANION GAP SERPL CALC-SCNC: 10 MMOL/L
APPEARANCE: CLEAR
AST SERPL-CCNC: 25 U/L
BACTERIA: NEGATIVE
BASOPHILS # BLD AUTO: 0.04 K/UL
BASOPHILS NFR BLD AUTO: 0.6 %
BILIRUB SERPL-MCNC: 1.1 MG/DL
BILIRUBIN URINE: NEGATIVE
BLOOD URINE: NEGATIVE
BUN SERPL-MCNC: 24 MG/DL
CALCIUM SERPL-MCNC: 9.5 MG/DL
CHLORIDE SERPL-SCNC: 104 MMOL/L
CHOLEST SERPL-MCNC: 111 MG/DL
CK SERPL-CCNC: 138 U/L
CO2 SERPL-SCNC: 28 MMOL/L
COLOR: NORMAL
CREAT SERPL-MCNC: 1.1 MG/DL
CRP SERPL-MCNC: <3 MG/L
EOSINOPHIL # BLD AUTO: 0.13 K/UL
EOSINOPHIL NFR BLD AUTO: 2 %
ERYTHROCYTE [SEDIMENTATION RATE] IN BLOOD BY WESTERGREN METHOD: 17 MM/HR
ESTIMATED AVERAGE GLUCOSE: 117 MG/DL
GLUCOSE QUALITATIVE U: NEGATIVE
GLUCOSE SERPL-MCNC: 109 MG/DL
HBA1C MFR BLD HPLC: 5.7 %
HCT VFR BLD CALC: 44.1 %
HDLC SERPL-MCNC: 41 MG/DL
HGB BLD-MCNC: 14.1 G/DL
HYALINE CASTS: 0 /LPF
IMM GRANULOCYTES NFR BLD AUTO: 0.5 %
KETONES URINE: NEGATIVE
LDLC SERPL CALC-MCNC: 54 MG/DL
LEUKOCYTE ESTERASE URINE: NEGATIVE
LYMPHOCYTES # BLD AUTO: 2.14 K/UL
LYMPHOCYTES NFR BLD AUTO: 32.3 %
MAN DIFF?: NORMAL
MCHC RBC-ENTMCNC: 30.3 PG
MCHC RBC-ENTMCNC: 32 GM/DL
MCV RBC AUTO: 94.6 FL
MICROSCOPIC-UA: NORMAL
MONOCYTES # BLD AUTO: 0.43 K/UL
MONOCYTES NFR BLD AUTO: 6.5 %
NEUTROPHILS # BLD AUTO: 3.85 K/UL
NEUTROPHILS NFR BLD AUTO: 58.1 %
NITRITE URINE: NEGATIVE
NONHDLC SERPL-MCNC: 70 MG/DL
PH URINE: 6.5
PLATELET # BLD AUTO: 135 K/UL
POTASSIUM SERPL-SCNC: 4 MMOL/L
PROT SERPL-MCNC: 6.8 G/DL
PROTEIN URINE: NORMAL
RBC # BLD: 4.66 M/UL
RBC # FLD: 13.5 %
RED BLOOD CELLS URINE: 1 /HPF
SODIUM SERPL-SCNC: 142 MMOL/L
SPECIFIC GRAVITY URINE: 1.02
SQUAMOUS EPITHELIAL CELLS: 0 /HPF
T4 FREE SERPL-MCNC: 1.4 NG/DL
TRIGL SERPL-MCNC: 81 MG/DL
TSH SERPL-ACNC: 0.84 UIU/ML
UROBILINOGEN URINE: NORMAL
WBC # FLD AUTO: 6.62 K/UL
WHITE BLOOD CELLS URINE: 0 /HPF

## 2021-10-26 ENCOUNTER — APPOINTMENT (OUTPATIENT)
Dept: INTERNAL MEDICINE | Facility: CLINIC | Age: 86
End: 2021-10-26
Payer: MEDICARE

## 2021-10-26 VITALS
BODY MASS INDEX: 27.47 KG/M2 | TEMPERATURE: 97.8 F | OXYGEN SATURATION: 98 % | WEIGHT: 175 LBS | HEIGHT: 67 IN | HEART RATE: 84 BPM

## 2021-10-26 PROCEDURE — G0008: CPT

## 2021-10-26 PROCEDURE — 90662 IIV NO PRSV INCREASED AG IM: CPT

## 2021-10-26 PROCEDURE — 99214 OFFICE O/P EST MOD 30 MIN: CPT | Mod: 25

## 2021-10-27 VITALS — DIASTOLIC BLOOD PRESSURE: 85 MMHG | SYSTOLIC BLOOD PRESSURE: 135 MMHG

## 2021-10-27 NOTE — HISTORY OF PRESENT ILLNESS
[FreeTextEntry1] : left eye cataract surgery [FreeTextEntry4] : The patient is here for a medical clearance prior to planned cataract surgery.\par \par He feels well.  He exercises regularly and he denies chest pain or dyspnea.\par \par His diet is healthy. [FreeTextEntry2] : 11/2/21

## 2021-10-27 NOTE — PHYSICAL EXAM
[Normal S1, S2] : normal S1 and S2 [No Murmur] : no murmur heard [Normal] : soft, non-tender, non-distended, no masses palpated, no HSM and normal bowel sounds [de-identified] : irregular rhythm

## 2021-10-27 NOTE — ASSESSMENT
[Patient Optimized for Surgery] : Patient optimized for surgery [No Further Testing Recommended] : no further testing recommended [FreeTextEntry4] : The patient is at low medical risk for the planned procedure.  He has no symptoms to suggest cardiac ischemia.  The blood pressure is borderline elevated and it was suggested that he increase the Losartan to 100 mg.  He is reluctant because he says he has been lightheaded in the past on the higher dose.  He is seeing his cardiologist in three days and he will discuss it with him.  \par \par He is on Xarelto which can probably be continued for cataract surgery but the patient was advised to discuss it with the ophthalmologist.  \par \par He has had the COVID-19 vaccine and he will have the booster.\par \par Flu vaccine today.

## 2021-10-29 ENCOUNTER — NON-APPOINTMENT (OUTPATIENT)
Age: 86
End: 2021-10-29

## 2021-10-29 ENCOUNTER — APPOINTMENT (OUTPATIENT)
Dept: CARDIOLOGY | Facility: CLINIC | Age: 86
End: 2021-10-29
Payer: MEDICARE

## 2021-10-29 VITALS
DIASTOLIC BLOOD PRESSURE: 82 MMHG | SYSTOLIC BLOOD PRESSURE: 132 MMHG | BODY MASS INDEX: 27.41 KG/M2 | HEART RATE: 82 BPM | OXYGEN SATURATION: 99 % | WEIGHT: 175 LBS

## 2021-10-29 DIAGNOSIS — Z01.810 ENCOUNTER FOR PREPROCEDURAL CARDIOVASCULAR EXAMINATION: ICD-10-CM

## 2021-10-29 PROCEDURE — 93000 ELECTROCARDIOGRAM COMPLETE: CPT

## 2021-10-29 PROCEDURE — 99214 OFFICE O/P EST MOD 30 MIN: CPT

## 2021-10-29 NOTE — DISCUSSION/SUMMARY
[FreeTextEntry1] : He is an 85-year-old with a history of chronic atrial fibrillation, hypertension who appears to be in good physical shape otherwise.\par His blood pressure control is much better.\par AF: Heart rate is okay. continue Toprol-XL to 25 mg once daily. \par Continue Xarelto 20 mg once daily. If headaches persist will change to eliquis.\par LDL: at goal. Continue statin.\par Okay to have cataract surgery.\par No changes to his medical regimen. Continue Xarelto as is.

## 2021-10-29 NOTE — HISTORY OF PRESENT ILLNESS
[FreeTextEntry1] : No further headaches.\par Planning cataract surgery.\par Exercising routinely without difficulty.\par No lightheadedness or dizziness or palpitations.\par \par \par Prior:\par Morning headeaches for the past 4 days.\par started xarelto 5 days ago. INR was 1.4\par Metoprolol increased to 50 then back to 25.\par Toothaches and surgical treatment.\par no bleeding or bruising.\par \par \par Dear Jeferson,\par Thank you for referring him for cardiovascular valuation and management of his cardiac issues.  He is a an 85-year-old with a history of chronic atrial fibrillation, hypercholesterolemia, hypertension, right bundle branch block and arthritis who is physically active and currently rides a motorcycle.  He is planning a 4-day by [facility] in the near future.\par He reports being physically active including being able to walk a couple miles without any chest pains or shortness of breath.  He does up to 500 crutches and 60 push-ups on a routine basis without difficulty.\par His past medical history is notable for thyroid cancer status post resection.\par He has no history of coronary artery disease, diabetes mellitus, smoking or family history of premature coronary artery disease.\par His wife is a patient of ours as well.\par

## 2021-10-29 NOTE — PHYSICAL EXAM
[Well Developed] : well developed [Well Nourished] : well nourished [No Acute Distress] : no acute distress [Normal Conjunctiva] : normal conjunctiva [Normal Venous Pressure] : normal venous pressure [Normal S1, S2] : normal S1, S2 [No Murmur] : no murmur [No Rub] : no rub [Clear Lung Fields] : clear lung fields [Good Air Entry] : good air entry [No Respiratory Distress] : no respiratory distress  [Soft] : abdomen soft [Normal Bowel Sounds] : normal bowel sounds [Normal Gait] : normal gait [No Edema] : no edema [No Cyanosis] : no cyanosis [No Rash] : no rash [Moves all extremities] : moves all extremities [No Focal Deficits] : no focal deficits [Normal Speech] : normal speech [Alert and Oriented] : alert and oriented [Normal memory] : normal memory [de-identified] : Irregular rhythm

## 2021-11-02 ENCOUNTER — NON-APPOINTMENT (OUTPATIENT)
Age: 86
End: 2021-11-02

## 2021-11-02 ENCOUNTER — APPOINTMENT (OUTPATIENT)
Dept: OPHTHALMOLOGY | Facility: AMBULATORY SURGERY CENTER | Age: 86
End: 2021-11-02
Payer: MEDICARE

## 2021-11-02 PROCEDURE — 66984 XCAPSL CTRC RMVL W/O ECP: CPT | Mod: LT

## 2021-11-03 ENCOUNTER — APPOINTMENT (OUTPATIENT)
Dept: OPHTHALMOLOGY | Facility: CLINIC | Age: 86
End: 2021-11-03
Payer: MEDICARE

## 2021-11-03 ENCOUNTER — NON-APPOINTMENT (OUTPATIENT)
Age: 86
End: 2021-11-03

## 2021-11-03 PROCEDURE — 92012 INTRM OPH EXAM EST PATIENT: CPT | Mod: 24

## 2021-11-09 ENCOUNTER — APPOINTMENT (OUTPATIENT)
Dept: OPHTHALMOLOGY | Facility: CLINIC | Age: 86
End: 2021-11-09
Payer: MEDICARE

## 2021-11-09 ENCOUNTER — NON-APPOINTMENT (OUTPATIENT)
Age: 86
End: 2021-11-09

## 2021-11-09 PROCEDURE — 99024 POSTOP FOLLOW-UP VISIT: CPT

## 2021-11-15 ENCOUNTER — APPOINTMENT (OUTPATIENT)
Dept: OPHTHALMOLOGY | Facility: CLINIC | Age: 86
End: 2021-11-15
Payer: MEDICARE

## 2021-11-15 ENCOUNTER — NON-APPOINTMENT (OUTPATIENT)
Age: 86
End: 2021-11-15

## 2021-11-15 PROCEDURE — 99024 POSTOP FOLLOW-UP VISIT: CPT

## 2021-12-02 ENCOUNTER — NON-APPOINTMENT (OUTPATIENT)
Age: 86
End: 2021-12-02

## 2021-12-02 ENCOUNTER — APPOINTMENT (OUTPATIENT)
Dept: OPHTHALMOLOGY | Facility: CLINIC | Age: 86
End: 2021-12-02
Payer: MEDICARE

## 2021-12-02 PROCEDURE — 92015 DETERMINE REFRACTIVE STATE: CPT

## 2021-12-02 PROCEDURE — 99024 POSTOP FOLLOW-UP VISIT: CPT

## 2022-03-15 ENCOUNTER — APPOINTMENT (OUTPATIENT)
Dept: CARDIOLOGY | Facility: CLINIC | Age: 87
End: 2022-03-15
Payer: MEDICARE

## 2022-03-15 ENCOUNTER — NON-APPOINTMENT (OUTPATIENT)
Age: 87
End: 2022-03-15

## 2022-03-15 VITALS
HEART RATE: 98 BPM | WEIGHT: 177 LBS | OXYGEN SATURATION: 98 % | BODY MASS INDEX: 27.72 KG/M2 | SYSTOLIC BLOOD PRESSURE: 136 MMHG | DIASTOLIC BLOOD PRESSURE: 80 MMHG

## 2022-03-15 PROCEDURE — 99214 OFFICE O/P EST MOD 30 MIN: CPT

## 2022-03-15 PROCEDURE — 93000 ELECTROCARDIOGRAM COMPLETE: CPT

## 2022-03-15 NOTE — HISTORY OF PRESENT ILLNESS
[FreeTextEntry1] : Feels well overall. Staying active, no bleeding or bruising issues.\par CBD improved his tinnitus.\par pins and needles at the end of the day in his feet.\par Some MACHUCA after walking uphill\par \par Prior:\par No further headaches.\par Planning cataract surgery.\par Exercising routinely without difficulty.\par No lightheadedness or dizziness or palpitations.\par \par Prior:\par Morning headeaches for the past 4 days.\par started xarelto 5 days ago. INR was 1.4\par Metoprolol increased to 50 then back to 25.\par Toothaches and surgical treatment.\par no bleeding or bruising.\par \par \par Dear Jeferson,\par Thank you for referring him for cardiovascular valuation and management of his cardiac issues.  He is a an 85-year-old with a history of chronic atrial fibrillation, hypercholesterolemia, hypertension, right bundle branch block and arthritis who is physically active and currently rides a motorcycle.  He is planning a 4-day by [facility] in the near future.\par He reports being physically active including being able to walk a couple miles without any chest pains or shortness of breath.  He does up to 500 crutches and 60 push-ups on a routine basis without difficulty.\par His past medical history is notable for thyroid cancer status post resection.\par He has no history of coronary artery disease, diabetes mellitus, smoking or family history of premature coronary artery disease.\par His wife is a patient of ours as well.\par

## 2022-03-15 NOTE — PHYSICAL EXAM
[Well Developed] : well developed [Well Nourished] : well nourished [No Acute Distress] : no acute distress [Normal Conjunctiva] : normal conjunctiva [Normal Venous Pressure] : normal venous pressure [Normal S1, S2] : normal S1, S2 [No Murmur] : no murmur [No Rub] : no rub [Clear Lung Fields] : clear lung fields [Good Air Entry] : good air entry [No Respiratory Distress] : no respiratory distress  [Soft] : abdomen soft [Normal Bowel Sounds] : normal bowel sounds [Normal Gait] : normal gait [No Edema] : no edema [No Cyanosis] : no cyanosis [No Rash] : no rash [Moves all extremities] : moves all extremities [No Focal Deficits] : no focal deficits [Normal Speech] : normal speech [Alert and Oriented] : alert and oriented [Normal memory] : normal memory [de-identified] : Irregular rhythm

## 2022-03-15 NOTE — DISCUSSION/SUMMARY
[FreeTextEntry1] : He is an 86-year-old with a history of chronic atrial fibrillation (20 yrs), hypertension who appears to be in good physical shape otherwise.\par HTN:  His blood pressure control is normal.\par AF: Heart rate is okay. continue Toprol-XL to 25 mg once daily. \par Continue Xarelto 20 mg once daily. Headaches resolved.\par LDL: at goal. Continue statin.\par Echo for LA size\par exercise stress test to monitor HR response to exercise.\par No changes to his medical regimen.

## 2022-05-05 ENCOUNTER — RX RENEWAL (OUTPATIENT)
Age: 87
End: 2022-05-05

## 2022-06-06 ENCOUNTER — RX RENEWAL (OUTPATIENT)
Age: 87
End: 2022-06-06

## 2022-06-14 ENCOUNTER — APPOINTMENT (OUTPATIENT)
Dept: OPHTHALMOLOGY | Facility: CLINIC | Age: 87
End: 2022-06-14
Payer: MEDICARE

## 2022-06-14 ENCOUNTER — NON-APPOINTMENT (OUTPATIENT)
Age: 87
End: 2022-06-14

## 2022-06-14 PROCEDURE — 92012 INTRM OPH EXAM EST PATIENT: CPT

## 2022-06-16 ENCOUNTER — APPOINTMENT (OUTPATIENT)
Dept: DERMATOLOGY | Facility: CLINIC | Age: 87
End: 2022-06-16
Payer: MEDICARE

## 2022-06-16 ENCOUNTER — APPOINTMENT (OUTPATIENT)
Dept: CARDIOLOGY | Facility: CLINIC | Age: 87
End: 2022-06-16
Payer: MEDICARE

## 2022-06-16 ENCOUNTER — LABORATORY RESULT (OUTPATIENT)
Age: 87
End: 2022-06-16

## 2022-06-16 ENCOUNTER — NON-APPOINTMENT (OUTPATIENT)
Age: 87
End: 2022-06-16

## 2022-06-16 VITALS — HEIGHT: 67 IN | WEIGHT: 175 LBS | BODY MASS INDEX: 27.47 KG/M2

## 2022-06-16 DIAGNOSIS — L30.0 NUMMULAR DERMATITIS: ICD-10-CM

## 2022-06-16 PROCEDURE — 93306 TTE W/DOPPLER COMPLETE: CPT

## 2022-06-16 PROCEDURE — 93015 CV STRESS TEST SUPVJ I&R: CPT

## 2022-06-16 PROCEDURE — 12031 INTMD RPR S/A/T/EXT 2.5 CM/<: CPT

## 2022-06-16 PROCEDURE — 99203 OFFICE O/P NEW LOW 30 MIN: CPT | Mod: 25

## 2022-06-16 PROCEDURE — 11401 EXC TR-EXT B9+MARG 0.6-1 CM: CPT

## 2022-06-16 NOTE — PHYSICAL EXAM
[FreeTextEntry3] : AAOx3, pleasant, NAD, no visual lymphadenopathy\par hair, scalp, face, nose, eyelids, ears, lips, oropharynx, neck, chest, abdomen, back, right arm, left arm, nails, and hands examined with all normal findings,\par pertinent findings include:\par \par left axillae with red papule\par erythematous patch on left lower leg

## 2022-06-16 NOTE — HISTORY OF PRESENT ILLNESS
[FreeTextEntry1] : bump in armpit [de-identified] : 86 year old male here with bump in armpit. rash on left lower leg.

## 2022-06-16 NOTE — ASSESSMENT
[FreeTextEntry1] : nummular derm\par -education\par -gentle skin care reviewed\par -betamethasone ointment BID PRN; SED\par \par NUB\par excision as above

## 2022-06-16 NOTE — PROCEDURE
[___ cm] : [unfilled] cm [Pressure] : pressure [4-0] : 4-0 Chromic [____ cm] : [unfilled] cm [____ days] : [unfilled] days [FreeTextEntry1] : Frantz Gibbs [FreeTextEntry7] : left axillae [TWNoteComboBox1] : Epidermal Inclusion Cyst [TWNoteComboBox4] : Epidermal Inclusion Cyst [TWNoteComboBox3] : Subcutaneous fat [TWNoteComboBox5] : Subcutaneous fat

## 2022-07-05 ENCOUNTER — NON-APPOINTMENT (OUTPATIENT)
Age: 87
End: 2022-07-05

## 2022-08-10 ENCOUNTER — APPOINTMENT (OUTPATIENT)
Dept: DERMATOLOGY | Facility: CLINIC | Age: 87
End: 2022-08-10

## 2022-08-10 ENCOUNTER — LABORATORY RESULT (OUTPATIENT)
Age: 87
End: 2022-08-10

## 2022-08-10 PROCEDURE — 14000 TIS TRNFR TRUNK 10 SQ CM/<: CPT

## 2022-08-10 NOTE — PROCEDURE
[___ mm] : [unfilled] mm [___ cm] : [unfilled] cm [___ ml of 1% lidocaine w/ 1:100,000 epinephrine] : [unfilled] ml of 1% lidocaine with 1:100,000 epinephrine [Pressure] : pressure [Electrodessication] : electrodessication [4-0] : 4-0 Vicryl [5-0] : 5-0  Fast absorbing gut [____ cm] : [unfilled] cm [____ days] : [unfilled] days [____ x ____ cm] : [unfilled] cm [FreeTextEntry1] : Frantz Gibbs [FreeTextEntry7] : left axillae [TWNoteComboBox3] : Subcutaneous fat [TWNoteComboBox5] : Subcutaneous fat

## 2022-08-12 ENCOUNTER — APPOINTMENT (OUTPATIENT)
Dept: INTERNAL MEDICINE | Facility: CLINIC | Age: 87
End: 2022-08-12

## 2022-08-12 VITALS
HEIGHT: 67 IN | TEMPERATURE: 97.6 F | BODY MASS INDEX: 27.78 KG/M2 | WEIGHT: 177 LBS | OXYGEN SATURATION: 99 % | HEART RATE: 78 BPM

## 2022-08-12 DIAGNOSIS — Z86.79 PERSONAL HISTORY OF OTHER DISEASES OF THE CIRCULATORY SYSTEM: ICD-10-CM

## 2022-08-12 DIAGNOSIS — D48.5 NEOPLASM OF UNCERTAIN BEHAVIOR OF SKIN: ICD-10-CM

## 2022-08-12 PROCEDURE — G0439: CPT

## 2022-08-12 PROCEDURE — G0444 DEPRESSION SCREEN ANNUAL: CPT

## 2022-08-12 PROCEDURE — 36415 COLL VENOUS BLD VENIPUNCTURE: CPT

## 2022-08-15 VITALS — SYSTOLIC BLOOD PRESSURE: 145 MMHG | DIASTOLIC BLOOD PRESSURE: 85 MMHG

## 2022-08-15 NOTE — HEALTH RISK ASSESSMENT
[Never] : Never [No] : No [No falls in past year] : Patient reported no falls in the past year [0] : 2) Feeling down, depressed, or hopeless: Not at all (0) [Fully functional (bathing, dressing, toileting, transferring, walking, feeding)] : Fully functional (bathing, dressing, toileting, transferring, walking, feeding) [Fully functional (using the telephone, shopping, preparing meals, housekeeping, doing laundry, using] : Fully functional and needs no help or supervision to perform IADLs (using the telephone, shopping, preparing meals, housekeeping, doing laundry, using transportation, managing medications and managing finances) [[id number]] : 0 [Reports changes in hearing] : Reports no changes in hearing [Reports changes in vision] : Reports no changes in vision [Reports changes in dental health] : Reports no changes in dental health

## 2022-08-15 NOTE — HISTORY OF PRESENT ILLNESS
[FreeTextEntry1] : The patient is here for a routine visit.  [de-identified] : He is doing some exercise.  No chest pain or dyspnea. His diet is good.\par \par He had COVID-19 a month ago while he was in [place]. Symptoms resolved.

## 2022-08-15 NOTE — ASSESSMENT
[FreeTextEntry1] : Discussed diet and exercise.  Check lipids on Atorvastatin.  He saw his cardiologist.\par \par The BP is a little elevated and has been slightly high.  Increase Losartan to 75 mg.  Monitor for orthostatic symptoms since he had orthostatic symptoms in the past on Losartan 100 mg.  \par \par He had the COVID-19 vaccine and booster.  He can have the second booster later.\par \par Shingrix advised at pharmacy.  S/p Prevnar, Pneumovax.\par \par Colonoscopy 12/10- defer due to his age.\par \par He had a skin lesion removed from close to his left axilla.  Pathology pending.\par \par He sees his endocrinologist.

## 2022-08-18 ENCOUNTER — NON-APPOINTMENT (OUTPATIENT)
Age: 87
End: 2022-08-18

## 2022-08-19 ENCOUNTER — NON-APPOINTMENT (OUTPATIENT)
Age: 87
End: 2022-08-19

## 2022-08-30 ENCOUNTER — NON-APPOINTMENT (OUTPATIENT)
Age: 87
End: 2022-08-30

## 2022-08-31 ENCOUNTER — NON-APPOINTMENT (OUTPATIENT)
Age: 87
End: 2022-08-31

## 2022-09-13 ENCOUNTER — APPOINTMENT (OUTPATIENT)
Dept: CARDIOLOGY | Facility: CLINIC | Age: 87
End: 2022-09-13

## 2022-09-13 ENCOUNTER — NON-APPOINTMENT (OUTPATIENT)
Age: 87
End: 2022-09-13

## 2022-09-13 VITALS
DIASTOLIC BLOOD PRESSURE: 80 MMHG | SYSTOLIC BLOOD PRESSURE: 120 MMHG | HEART RATE: 97 BPM | OXYGEN SATURATION: 98 % | WEIGHT: 176 LBS | HEIGHT: 67 IN | BODY MASS INDEX: 27.62 KG/M2

## 2022-09-13 PROCEDURE — 93000 ELECTROCARDIOGRAM COMPLETE: CPT

## 2022-09-13 PROCEDURE — 99214 OFFICE O/P EST MOD 30 MIN: CPT

## 2022-09-13 NOTE — HISTORY OF PRESENT ILLNESS
[FreeTextEntry1] : Bikes and golfs without difficulty. Crunches and sit ups.\par Reports Some "brain fog" since covid in July.\par Now tolerating losartan 75 qd.\par June 2022: Echo stable valve issues. repeat next year. exercise stress test showed no ischemia.\par \par Prior:\par Feels well overall. Staying active, no bleeding or bruising issues.\par CBD improved his tinnitus.\par pins and needles at the end of the day in his feet.\par Some MACHUCA after walking uphill\par \par Prior:\par No further headaches.\par Planning cataract surgery.\par Exercising routinely without difficulty.\par No lightheadedness or dizziness or palpitations.\par \par Prior:\par Morning headeaches for the past 4 days.\par started xarelto 5 days ago. INR was 1.4\par Metoprolol increased to 50 then back to 25.\par Toothaches and surgical treatment.\par no bleeding or bruising.\par \par \par Dear Jeferson,\par Thank you for referring him for cardiovascular valuation and management of his cardiac issues.  He is a an 85-year-old with a history of chronic atrial fibrillation, hypercholesterolemia, hypertension, right bundle branch block and arthritis who is physically active and currently rides a motorcycle.  He is planning a 4-day by [facility] in the near future.\par He reports being physically active including being able to walk a couple miles without any chest pains or shortness of breath.  He does up to 500 crutches and 60 push-ups on a routine basis without difficulty.\par His past medical history is notable for thyroid cancer status post resection.\par He has no history of coronary artery disease, diabetes mellitus, smoking or family history of premature coronary artery disease.\par His wife is a patient of ours as well.\par

## 2022-09-13 NOTE — PHYSICAL EXAM
[Well Developed] : well developed [Well Nourished] : well nourished [No Acute Distress] : no acute distress [Normal Conjunctiva] : normal conjunctiva [Normal Venous Pressure] : normal venous pressure [Normal S1, S2] : normal S1, S2 [No Murmur] : no murmur [No Rub] : no rub [Clear Lung Fields] : clear lung fields [Good Air Entry] : good air entry [No Respiratory Distress] : no respiratory distress  [Soft] : abdomen soft [Normal Bowel Sounds] : normal bowel sounds [Normal Gait] : normal gait [No Edema] : no edema [No Cyanosis] : no cyanosis [No Rash] : no rash [Moves all extremities] : moves all extremities [No Focal Deficits] : no focal deficits [Normal Speech] : normal speech [Alert and Oriented] : alert and oriented [Normal memory] : normal memory [de-identified] : Irregular rhythm

## 2022-09-13 NOTE — DISCUSSION/SUMMARY
[FreeTextEntry1] : He is an 86-year-old with a history of chronic atrial fibrillation (20 yrs), hypertension who appears to be in good physical shape otherwise.\par HTN:  His blood pressure control is normal.\par AF: Permanent. Heart rate is okay. continue Toprol-XL to 25 mg once daily. \par Continue Xarelto 20 mg once daily.  \par LDL: 51 at goal. Continue statin.\par Echo stable valve issues. repeat next year. exercise stress test showed no ischemia.\par No changes to his medical regimen.  \par See me in 6 months.

## 2022-10-24 ENCOUNTER — RX RENEWAL (OUTPATIENT)
Age: 87
End: 2022-10-24

## 2022-11-07 ENCOUNTER — RX RENEWAL (OUTPATIENT)
Age: 87
End: 2022-11-07

## 2022-12-09 LAB
25(OH)D3 SERPL-MCNC: 60.7 NG/ML
ALBUMIN SERPL ELPH-MCNC: 4.5 G/DL
ALP BLD-CCNC: 102 U/L
ALT SERPL-CCNC: 23 U/L
ANION GAP SERPL CALC-SCNC: 11 MMOL/L
APPEARANCE: CLEAR
AST SERPL-CCNC: 26 U/L
BACTERIA: NEGATIVE
BASOPHILS # BLD AUTO: 0.05 K/UL
BASOPHILS NFR BLD AUTO: 0.7 %
BILIRUB SERPL-MCNC: 1.3 MG/DL
BILIRUBIN URINE: NEGATIVE
BLOOD URINE: NEGATIVE
BUN SERPL-MCNC: 20 MG/DL
CALCIUM SERPL-MCNC: 9.7 MG/DL
CHLORIDE SERPL-SCNC: 104 MMOL/L
CHOLEST SERPL-MCNC: 115 MG/DL
CO2 SERPL-SCNC: 26 MMOL/L
COLOR: NORMAL
CREAT SERPL-MCNC: 1.21 MG/DL
EGFR: 59 ML/MIN/1.73M2
EOSINOPHIL # BLD AUTO: 0.16 K/UL
EOSINOPHIL NFR BLD AUTO: 2.2 %
ESTIMATED AVERAGE GLUCOSE: 120 MG/DL
GLUCOSE QUALITATIVE U: NEGATIVE
GLUCOSE SERPL-MCNC: 109 MG/DL
HBA1C MFR BLD HPLC: 5.8 %
HCT VFR BLD CALC: 42.5 %
HDLC SERPL-MCNC: 44 MG/DL
HGB BLD-MCNC: 14.2 G/DL
HYALINE CASTS: 0 /LPF
IMM GRANULOCYTES NFR BLD AUTO: 0.3 %
KETONES URINE: NEGATIVE
LDLC SERPL CALC-MCNC: 51 MG/DL
LEUKOCYTE ESTERASE URINE: NEGATIVE
LYMPHOCYTES # BLD AUTO: 2.76 K/UL
LYMPHOCYTES NFR BLD AUTO: 37.7 %
MAN DIFF?: NORMAL
MCHC RBC-ENTMCNC: 30.9 PG
MCHC RBC-ENTMCNC: 33.4 GM/DL
MCV RBC AUTO: 92.4 FL
MICROSCOPIC-UA: NORMAL
MONOCYTES # BLD AUTO: 0.54 K/UL
MONOCYTES NFR BLD AUTO: 7.4 %
NEUTROPHILS # BLD AUTO: 3.79 K/UL
NEUTROPHILS NFR BLD AUTO: 51.7 %
NITRITE URINE: NEGATIVE
NONHDLC SERPL-MCNC: 71 MG/DL
PH URINE: 6.5
PLATELET # BLD AUTO: 138 K/UL
POTASSIUM SERPL-SCNC: 4.1 MMOL/L
PROT SERPL-MCNC: 6.9 G/DL
PROTEIN URINE: NEGATIVE
RBC # BLD: 4.6 M/UL
RBC # FLD: 14.3 %
RED BLOOD CELLS URINE: 1 /HPF
SODIUM SERPL-SCNC: 141 MMOL/L
SPECIFIC GRAVITY URINE: 1.01
SQUAMOUS EPITHELIAL CELLS: 0 /HPF
T4 FREE SERPL-MCNC: 1.4 NG/DL
TRIGL SERPL-MCNC: 99 MG/DL
TSH SERPL-ACNC: 1.12 UIU/ML
UROBILINOGEN URINE: NORMAL
WBC # FLD AUTO: 7.32 K/UL
WHITE BLOOD CELLS URINE: 0 /HPF

## 2022-12-14 ENCOUNTER — NON-APPOINTMENT (OUTPATIENT)
Age: 87
End: 2022-12-14

## 2022-12-14 ENCOUNTER — APPOINTMENT (OUTPATIENT)
Dept: CARDIOLOGY | Facility: CLINIC | Age: 87
End: 2022-12-14

## 2022-12-14 VITALS
HEART RATE: 96 BPM | DIASTOLIC BLOOD PRESSURE: 90 MMHG | BODY MASS INDEX: 27.78 KG/M2 | OXYGEN SATURATION: 98 % | HEIGHT: 67 IN | WEIGHT: 177 LBS | SYSTOLIC BLOOD PRESSURE: 130 MMHG

## 2022-12-14 PROCEDURE — 99214 OFFICE O/P EST MOD 30 MIN: CPT

## 2022-12-14 PROCEDURE — 93000 ELECTROCARDIOGRAM COMPLETE: CPT

## 2022-12-14 NOTE — HISTORY OF PRESENT ILLNESS
[FreeTextEntry1] : He was seen by his dentist and noted elevated diastolic pressure (taken with an automatic cuff).\par Feels well overall.\par Recent bronchitis. Now resolved with abx and steroids.\par \par Prior:\par Bikes and golfs without difficulty. Crunches and sit ups.\par Reports Some "brain fog" since covid in July.\par Now tolerating losartan 75 qd.\par June 2022: Echo stable valve issues. repeat next year. exercise stress test showed no ischemia.\par \par Prior:\par Feels well overall. Staying active, no bleeding or bruising issues.\par CBD improved his tinnitus.\par pins and needles at the end of the day in his feet.\par Some MACHUCA after walking uphill\par \par Prior:\par No further headaches.\par Planning cataract surgery.\par Exercising routinely without difficulty.\par No lightheadedness or dizziness or palpitations.\par \par Prior:\par Morning headeaches for the past 4 days.\par started xarelto 5 days ago. INR was 1.4\par Metoprolol increased to 50 then back to 25.\par Toothaches and surgical treatment.\par no bleeding or bruising.\par \par \par Dear Jeferson,\par Thank you for referring him for cardiovascular valuation and management of his cardiac issues.  He is a an 85-year-old with a history of chronic atrial fibrillation, hypercholesterolemia, hypertension, right bundle branch block and arthritis who is physically active and currently rides a motorcycle.  He is planning a 4-day by [facility] in the near future.\par He reports being physically active including being able to walk a couple miles without any chest pains or shortness of breath.  He does up to 500 crutches and 60 push-ups on a routine basis without difficulty.\par His past medical history is notable for thyroid cancer status post resection.\par He has no history of coronary artery disease, diabetes mellitus, smoking or family history of premature coronary artery disease.\par His wife is a patient of ours as well.\par

## 2022-12-14 NOTE — DISCUSSION/SUMMARY
[FreeTextEntry1] : He is an 86-year-old with a history of chronic atrial fibrillation (20 yrs), hypertension who appears to be in good physical shape otherwise.\par Echo stable valve issues. repeat next year. exercise stress test showed no ischemia.\par HTN:  His blood pressure control is sub optimal. Will increase Toprol to 50 mg qd.\par AF: Permanent. Heart rate is a bit high will do well with Toprol-XL to 50 mg once daily. \par Continue Xarelto 20 mg once daily for stroke risk reduction.\par LDL: 51 at goal. Continue statin.\par \par No other changes to his medical regimen.  \par He is okay to have his dental procedure. He may stop the xarelto for 2 days prior to the procedure if significant bleeding is expected. Restart once bleeding has stopped.\par See me in 6 months. [EKG obtained to assist in diagnosis and management of assessed problem(s)] : EKG obtained to assist in diagnosis and management of assessed problem(s)

## 2022-12-14 NOTE — PHYSICAL EXAM
[Well Developed] : well developed [Well Nourished] : well nourished [No Acute Distress] : no acute distress [Normal Conjunctiva] : normal conjunctiva [Normal Venous Pressure] : normal venous pressure [Normal S1, S2] : normal S1, S2 [No Murmur] : no murmur [No Rub] : no rub [Clear Lung Fields] : clear lung fields [Good Air Entry] : good air entry [No Respiratory Distress] : no respiratory distress  [Soft] : abdomen soft [Normal Bowel Sounds] : normal bowel sounds [Normal Gait] : normal gait [No Edema] : no edema [No Cyanosis] : no cyanosis [No Rash] : no rash [Moves all extremities] : moves all extremities [No Focal Deficits] : no focal deficits [Normal Speech] : normal speech [Alert and Oriented] : alert and oriented [Normal memory] : normal memory [de-identified] : Irregular rhythm

## 2023-01-10 RX ORDER — ACYCLOVIR 50 MG/G
5 OINTMENT TOPICAL
Qty: 3 | Refills: 1 | Status: ACTIVE | OUTPATIENT
Start: 2022-03-07

## 2023-01-18 ENCOUNTER — APPOINTMENT (OUTPATIENT)
Dept: OPHTHALMOLOGY | Facility: CLINIC | Age: 88
End: 2023-01-18
Payer: MEDICARE

## 2023-01-18 ENCOUNTER — NON-APPOINTMENT (OUTPATIENT)
Age: 88
End: 2023-01-18

## 2023-01-18 PROCEDURE — 92014 COMPRE OPH EXAM EST PT 1/>: CPT

## 2023-03-29 ENCOUNTER — APPOINTMENT (OUTPATIENT)
Dept: CARDIOLOGY | Facility: CLINIC | Age: 88
End: 2023-03-29
Payer: MEDICARE

## 2023-03-29 ENCOUNTER — NON-APPOINTMENT (OUTPATIENT)
Age: 88
End: 2023-03-29

## 2023-03-29 VITALS
HEART RATE: 72 BPM | BODY MASS INDEX: 28.09 KG/M2 | HEIGHT: 67 IN | OXYGEN SATURATION: 99 % | WEIGHT: 179 LBS | SYSTOLIC BLOOD PRESSURE: 130 MMHG | DIASTOLIC BLOOD PRESSURE: 90 MMHG

## 2023-03-29 PROCEDURE — 99214 OFFICE O/P EST MOD 30 MIN: CPT

## 2023-03-29 PROCEDURE — 93000 ELECTROCARDIOGRAM COMPLETE: CPT

## 2023-03-29 NOTE — PHYSICAL EXAM
[Well Developed] : well developed [Well Nourished] : well nourished [No Acute Distress] : no acute distress [Normal Conjunctiva] : normal conjunctiva [Normal Venous Pressure] : normal venous pressure [Normal S1, S2] : normal S1, S2 [No Murmur] : no murmur [No Rub] : no rub [Clear Lung Fields] : clear lung fields [Good Air Entry] : good air entry [No Respiratory Distress] : no respiratory distress  [Soft] : abdomen soft [Normal Bowel Sounds] : normal bowel sounds [Normal Gait] : normal gait [No Edema] : no edema [No Cyanosis] : no cyanosis [No Rash] : no rash [Moves all extremities] : moves all extremities [No Focal Deficits] : no focal deficits [Normal Speech] : normal speech [Alert and Oriented] : alert and oriented [Normal memory] : normal memory [de-identified] : Irregular rhythm

## 2023-03-29 NOTE — HISTORY OF PRESENT ILLNESS
[FreeTextEntry1] : He is feeling well.\par Exercising by bike, pushups and crunches.\par No bleeding or bruising.\par \par \par Prior:\par He was seen by his dentist and noted elevated diastolic pressure (taken with an automatic cuff).\par Feels well overall.\par Recent bronchitis. Now resolved with abx and steroids.\par \par Prior:\par Bikes and golfs without difficulty. Crunches and sit ups.\par Reports Some "brain fog" since covid in July.\par Now tolerating losartan 75 qd.\par June 2022: Echo stable valve issues. repeat next year. exercise stress test showed no ischemia.\par \par Prior:\par Feels well overall. Staying active, no bleeding or bruising issues.\par CBD improved his tinnitus.\par pins and needles at the end of the day in his feet.\par Some MACHUCA after walking uphill\par \par Prior:\par No further headaches.\par Planning cataract surgery.\par Exercising routinely without difficulty.\par No lightheadedness or dizziness or palpitations.\par \par Prior:\par Morning headeaches for the past 4 days.\par started xarelto 5 days ago. INR was 1.4\par Metoprolol increased to 50 then back to 25.\par Toothaches and surgical treatment.\par no bleeding or bruising.\par \par \par Dear Jeferson,\par Thank you for referring him for cardiovascular valuation and management of his cardiac issues.  He is a an 85-year-old with a history of chronic atrial fibrillation, hypercholesterolemia, hypertension, right bundle branch block and arthritis who is physically active and currently rides a motorcycle.  He is planning a 4-day by [place] in the near future.\par He reports being physically active including being able to walk a couple miles without any chest pains or shortness of breath.  He does up to 500 crutches and 60 push-ups on a routine basis without difficulty.\par His past medical history is notable for thyroid cancer status post resection.\par He has no history of coronary artery disease, diabetes mellitus, smoking or family history of premature coronary artery disease.\par His wife is a patient of ours as well.\par

## 2023-03-29 NOTE — DISCUSSION/SUMMARY
[FreeTextEntry1] : He is an 87-year-old with a history of chronic atrial fibrillation (20 yrs), hypertension who appears to be in good physical shape otherwise.\par Echo stable valve issues. repeat next year. exercise stress test showed no ischemia.\par HTN:  His blood pressure control is borderline. Will continue losartan and Toprol to 50 mg qd. Prior uptitration led to orthostasis.\par \par AF: Permanent. Heart rate is a bit high will do well with Toprol-XL to 50 mg once daily. \par Continue Xarelto 20 mg once daily for stroke risk reduction.\par \par LDL: 51 at goal. Continue statin.\par \par No other changes to his medical regimen.  \par He is okay to have his future dental procedures. He may stop the xarelto for 2 days prior to the procedure if significant bleeding is expected. Restart once bleeding has stopped.\par See me in 6 months. [EKG obtained to assist in diagnosis and management of assessed problem(s)] : EKG obtained to assist in diagnosis and management of assessed problem(s)

## 2023-04-13 ENCOUNTER — RX RENEWAL (OUTPATIENT)
Age: 88
End: 2023-04-13

## 2023-06-19 ENCOUNTER — RX RENEWAL (OUTPATIENT)
Age: 88
End: 2023-06-19

## 2023-06-28 ENCOUNTER — RX RENEWAL (OUTPATIENT)
Age: 88
End: 2023-06-28

## 2023-08-15 ENCOUNTER — APPOINTMENT (OUTPATIENT)
Dept: INTERNAL MEDICINE | Facility: CLINIC | Age: 88
End: 2023-08-15
Payer: MEDICARE

## 2023-08-15 VITALS
HEART RATE: 89 BPM | TEMPERATURE: 97.2 F | HEIGHT: 67 IN | BODY MASS INDEX: 27.47 KG/M2 | OXYGEN SATURATION: 99 % | WEIGHT: 175 LBS

## 2023-08-15 VITALS — DIASTOLIC BLOOD PRESSURE: 80 MMHG | SYSTOLIC BLOOD PRESSURE: 142 MMHG

## 2023-08-15 DIAGNOSIS — Z01.818 ENCOUNTER FOR OTHER PREPROCEDURAL EXAMINATION: ICD-10-CM

## 2023-08-15 DIAGNOSIS — Z86.39 PERSONAL HISTORY OF OTHER ENDOCRINE, NUTRITIONAL AND METABOLIC DISEASE: ICD-10-CM

## 2023-08-15 DIAGNOSIS — Z00.00 ENCOUNTER FOR GENERAL ADULT MEDICAL EXAMINATION W/OUT ABNORMAL FINDINGS: ICD-10-CM

## 2023-08-15 PROCEDURE — G0439: CPT

## 2023-08-15 PROCEDURE — 36415 COLL VENOUS BLD VENIPUNCTURE: CPT

## 2023-08-15 PROCEDURE — 82270 OCCULT BLOOD FECES: CPT

## 2023-08-15 NOTE — HISTORY OF PRESENT ILLNESS
[FreeTextEntry1] : The patient is here for a routine visit.  [de-identified] : He feels well.  He exercises regularly.  No chest pain or dyspnea.  Diet is healthy and weight stable.  Tinnitus is the same.  He has now retired.  He has stopped riding a motorcycle.

## 2023-08-15 NOTE — ASSESSMENT
"Anticipated Discharge Disposition: TBD    Action: LSW met with pt at bedside and explained therapy recommendations again. Pt reports he does not feel safe for discharge because of the comorbid medical issues he has. Pt reports that he is currently having \"fuzzy\" vision and that he does not feel well because of his BP. Pt also reports that he is usually alone at home since family is at work during the days.     Patient consented to have HH choice sent to [facility] for resumption of care but states that he is still wanting rehab and wants to speak to Dr. Esteves about it. Bedside RN notified    Barriers to Discharge: HH acceptance, pt's reluctance to go home    Plan: Care coordination to follow and assist as needed.    " [FreeTextEntry1] : He feels well.  Discussed diet and exercise.  Check lipids on the statin.  The BP is acceptable at 135/80.  Note he had orthostatic symptoms when the meds were increased previously.  He saw his cardiologist recently.  COVID-19 new booster advised in the fall.  Shingrix advised at pharmacy.  S/p Prevnar and Pneumovax.    He sees an ophthalmologist.  he saw his endocrinologist, Dr. Murillo, about two months ago.  Defer colonoscopy.

## 2023-08-15 NOTE — HEALTH RISK ASSESSMENT
[No] : No [No falls in past year] : Patient reported no falls in the past year [0] : 2) Feeling down, depressed, or hopeless: Not at all (0) [[id number]] : 0 [Fully functional (bathing, dressing, toileting, transferring, walking, feeding)] : Fully functional (bathing, dressing, toileting, transferring, walking, feeding) [Fully functional (using the telephone, shopping, preparing meals, housekeeping, doing laundry, using] : Fully functional and needs no help or supervision to perform IADLs (using the telephone, shopping, preparing meals, housekeeping, doing laundry, using transportation, managing medications and managing finances) [Reports changes in hearing] : Reports no changes in hearing [Reports changes in vision] : Reports no changes in vision [Reports changes in dental health] : Reports no changes in dental health

## 2023-08-15 NOTE — PHYSICAL EXAM
[No Acute Distress] : no acute distress [Well Nourished] : well nourished [Well Developed] : well developed [Well-Appearing] : well-appearing [Normal Sclera/Conjunctiva] : normal sclera/conjunctiva [PERRL] : pupils equal round and reactive to light [EOMI] : extraocular movements intact [Normal Outer Ear/Nose] : the outer ears and nose were normal in appearance [Normal Oropharynx] : the oropharynx was normal [No JVD] : no jugular venous distention [No Lymphadenopathy] : no lymphadenopathy [Supple] : supple [Thyroid Normal, No Nodules] : the thyroid was normal and there were no nodules present [No Respiratory Distress] : no respiratory distress  [No Accessory Muscle Use] : no accessory muscle use [Clear to Auscultation] : lungs were clear to auscultation bilaterally [Normal Rate] : normal rate  [Normal S1, S2] : normal S1 and S2 [No Murmur] : no murmur heard [No Carotid Bruits] : no carotid bruits [No Abdominal Bruit] : a ~M bruit was not heard ~T in the abdomen [No Varicosities] : no varicosities [Pedal Pulses Present] : the pedal pulses are present [No Edema] : there was no peripheral edema [No Palpable Aorta] : no palpable aorta [No Extremity Clubbing/Cyanosis] : no extremity clubbing/cyanosis [Soft] : abdomen soft [Non Tender] : non-tender [Non-distended] : non-distended [No Masses] : no abdominal mass palpated [No HSM] : no HSM [Normal Bowel Sounds] : normal bowel sounds [Normal Sphincter Tone] : normal sphincter tone [No Mass] : no mass [Stool Occult Blood] : stool negative for occult blood [Normal Posterior Cervical Nodes] : no posterior cervical lymphadenopathy [Normal Anterior Cervical Nodes] : no anterior cervical lymphadenopathy [No CVA Tenderness] : no CVA  tenderness [No Spinal Tenderness] : no spinal tenderness [No Joint Swelling] : no joint swelling [Grossly Normal Strength/Tone] : grossly normal strength/tone [No Rash] : no rash [Coordination Grossly Intact] : coordination grossly intact [No Focal Deficits] : no focal deficits [Normal Gait] : normal gait [Deep Tendon Reflexes (DTR)] : deep tendon reflexes were 2+ and symmetric [Normal Affect] : the affect was normal [Normal Insight/Judgement] : insight and judgment were intact [de-identified] : irregular

## 2023-09-05 ENCOUNTER — RX RENEWAL (OUTPATIENT)
Age: 88
End: 2023-09-05

## 2023-09-05 ENCOUNTER — APPOINTMENT (OUTPATIENT)
Dept: CARDIOLOGY | Facility: CLINIC | Age: 88
End: 2023-09-05
Payer: MEDICARE

## 2023-09-05 ENCOUNTER — NON-APPOINTMENT (OUTPATIENT)
Age: 88
End: 2023-09-05

## 2023-09-05 VITALS
SYSTOLIC BLOOD PRESSURE: 144 MMHG | WEIGHT: 175 LBS | OXYGEN SATURATION: 97 % | HEART RATE: 58 BPM | RESPIRATION RATE: 17 BRPM | HEIGHT: 67 IN | DIASTOLIC BLOOD PRESSURE: 90 MMHG | BODY MASS INDEX: 27.47 KG/M2

## 2023-09-05 PROCEDURE — 99214 OFFICE O/P EST MOD 30 MIN: CPT

## 2023-09-05 PROCEDURE — 93000 ELECTROCARDIOGRAM COMPLETE: CPT

## 2023-09-05 NOTE — PHYSICAL EXAM
[Well Developed] : well developed [Well Nourished] : well nourished [No Acute Distress] : no acute distress [Normal Conjunctiva] : normal conjunctiva [Normal Venous Pressure] : normal venous pressure [Normal S1, S2] : normal S1, S2 [No Murmur] : no murmur [No Rub] : no rub [Clear Lung Fields] : clear lung fields [Good Air Entry] : good air entry [No Respiratory Distress] : no respiratory distress  [Soft] : abdomen soft [Normal Bowel Sounds] : normal bowel sounds [Normal Gait] : normal gait [No Edema] : no edema [No Cyanosis] : no cyanosis [No Rash] : no rash [Moves all extremities] : moves all extremities [No Focal Deficits] : no focal deficits [Normal Speech] : normal speech [Alert and Oriented] : alert and oriented [Normal memory] : normal memory [de-identified] : Irregular rhythm

## 2023-09-05 NOTE — DISCUSSION/SUMMARY
[FreeTextEntry1] : He is an 87-year-old with a history of chronic atrial fibrillation (20 yrs), hypertension who appears to be in good physical shape otherwise. Echo stable valve issues. repeat next year. exercise stress test showed no ischemia. HTN:  His blood pressure control remains borderline. Will continue losartan (losartan 75) and Toprol to 50 mg qd. Prior uptitration led to orthostasis.  AF: Permanent. Heart rate is a bit high will do well with Toprol-XL to 50 mg once daily.  Continue Xarelto 20 mg once daily for stroke risk reduction.  LDL: 52 at goal. Continue statin.  No other changes to his medical regimen.   Again, he is stable to have his planned dental procedures. He may stop the xarelto for 2 days prior to the procedure if significant bleeding is expected. Restart once bleeding has stopped. See me in 6 months. [EKG obtained to assist in diagnosis and management of assessed problem(s)] : EKG obtained to assist in diagnosis and management of assessed problem(s)

## 2023-09-05 NOTE — HISTORY OF PRESENT ILLNESS
[FreeTextEntry1] : 500 crunches 60 pushups qod. Walks 2 miles without difficulty. No bleeding issues. No change to bruising when he gets injured. No falls. Dealing with a close family friend who passed away.   Prior: He is feeling well. Exercising by bike, pushups and crunches. No bleeding or bruising.  Prior: He was seen by his dentist and noted elevated diastolic pressure (taken with an automatic cuff). Feels well overall. Recent bronchitis. Now resolved with abx and steroids.  Prior: Bikes and golfs without difficulty. Crunches and sit ups. Reports Some "brain fog" since covid in July. Now tolerating losartan 75 qd. June 2022: Echo stable valve issues. repeat next year. exercise stress test showed no ischemia.  Prior: Feels well overall. Staying active, no bleeding or bruising issues. CBD improved his tinnitus. pins and needles at the end of the day in his feet. Some MACHUCA after walking uphill  Prior: No further headaches. Planning cataract surgery. Exercising routinely without difficulty. No lightheadedness or dizziness or palpitations.  Prior: Morning headeaches for the past 4 days. started xarelto 5 days ago. INR was 1.4 Metoprolol increased to 50 then back to 25. Toothaches and surgical treatment. no bleeding or bruising.   Dear Jeferson, Thank you for referring him for cardiovascular valuation and management of his cardiac issues.  He is a an 85-year-old with a history of chronic atrial fibrillation, hypercholesterolemia, hypertension, right bundle branch block and arthritis who is physically active and currently rides a motorcycle.  He is planning a 4-day by [facility] in the near future. He reports being physically active including being able to walk a couple miles without any chest pains or shortness of breath.  He does up to 500 crutches and 60 push-ups on a routine basis without difficulty. His past medical history is notable for thyroid cancer status post resection. He has no history of coronary artery disease, diabetes mellitus, smoking or family history of premature coronary artery disease. His wife is a patient of ours as well.

## 2023-09-13 ENCOUNTER — RX RENEWAL (OUTPATIENT)
Age: 88
End: 2023-09-13

## 2023-09-30 ENCOUNTER — RX RENEWAL (OUTPATIENT)
Age: 88
End: 2023-09-30

## 2023-10-25 ENCOUNTER — NON-APPOINTMENT (OUTPATIENT)
Age: 88
End: 2023-10-25

## 2023-10-25 ENCOUNTER — APPOINTMENT (OUTPATIENT)
Dept: OPHTHALMOLOGY | Facility: CLINIC | Age: 88
End: 2023-10-25
Payer: MEDICARE

## 2023-10-25 PROCEDURE — 76514 ECHO EXAM OF EYE THICKNESS: CPT

## 2023-10-25 PROCEDURE — 92012 INTRM OPH EXAM EST PATIENT: CPT

## 2023-11-21 LAB
ALBUMIN SERPL ELPH-MCNC: 4.4 G/DL
ALP BLD-CCNC: 99 U/L
ALT SERPL-CCNC: 22 U/L
ANION GAP SERPL CALC-SCNC: 12 MMOL/L
APPEARANCE: CLEAR
AST SERPL-CCNC: 28 U/L
BACTERIA: NEGATIVE /HPF
BILIRUB SERPL-MCNC: 1.4 MG/DL
BILIRUBIN URINE: NEGATIVE
BLOOD URINE: NEGATIVE
BUN SERPL-MCNC: 20 MG/DL
CALCIUM SERPL-MCNC: 9.4 MG/DL
CAST: 0 /LPF
CHLORIDE SERPL-SCNC: 102 MMOL/L
CHOLEST SERPL-MCNC: 114 MG/DL
CO2 SERPL-SCNC: 26 MMOL/L
COLOR: YELLOW
CREAT SERPL-MCNC: 1.14 MG/DL
EGFR: 63 ML/MIN/1.73M2
EPITHELIAL CELLS: 0 /HPF
ESTIMATED AVERAGE GLUCOSE: 111 MG/DL
GLUCOSE QUALITATIVE U: NEGATIVE MG/DL
GLUCOSE SERPL-MCNC: 105 MG/DL
HBA1C MFR BLD HPLC: 5.5 %
HDLC SERPL-MCNC: 47 MG/DL
KETONES URINE: NEGATIVE MG/DL
LDLC SERPL CALC-MCNC: 52 MG/DL
LEUKOCYTE ESTERASE URINE: NEGATIVE
MICROSCOPIC-UA: NORMAL
NITRITE URINE: NEGATIVE
NONHDLC SERPL-MCNC: 66 MG/DL
PH URINE: 6.5
POTASSIUM SERPL-SCNC: 4.1 MMOL/L
PROT SERPL-MCNC: 6.5 G/DL
PROTEIN URINE: NORMAL MG/DL
RED BLOOD CELLS URINE: 0 /HPF
SODIUM SERPL-SCNC: 140 MMOL/L
SPECIFIC GRAVITY URINE: 1.02
TRIGL SERPL-MCNC: 68 MG/DL
UROBILINOGEN URINE: 0.2 MG/DL
WHITE BLOOD CELLS URINE: 1 /HPF

## 2024-02-28 ENCOUNTER — APPOINTMENT (OUTPATIENT)
Dept: OTOLARYNGOLOGY | Facility: CLINIC | Age: 89
End: 2024-02-28
Payer: MEDICARE

## 2024-02-28 VITALS
DIASTOLIC BLOOD PRESSURE: 89 MMHG | SYSTOLIC BLOOD PRESSURE: 148 MMHG | HEIGHT: 67 IN | BODY MASS INDEX: 27.47 KG/M2 | WEIGHT: 175 LBS

## 2024-02-28 PROCEDURE — G2211 COMPLEX E/M VISIT ADD ON: CPT

## 2024-02-28 PROCEDURE — 92557 COMPREHENSIVE HEARING TEST: CPT

## 2024-02-28 PROCEDURE — 92625 TINNITUS ASSESSMENT: CPT

## 2024-02-28 PROCEDURE — 92567 TYMPANOMETRY: CPT

## 2024-02-28 PROCEDURE — 99203 OFFICE O/P NEW LOW 30 MIN: CPT

## 2024-02-28 RX ORDER — BETAMETHASONE DIPROPIONATE 0.5 MG/G
0.05 OINTMENT TOPICAL TWICE DAILY
Qty: 1 | Refills: 3 | Status: DISCONTINUED | COMMUNITY
Start: 2022-06-16 | End: 2024-02-28

## 2024-03-01 ENCOUNTER — APPOINTMENT (OUTPATIENT)
Dept: CARDIOLOGY | Facility: CLINIC | Age: 89
End: 2024-03-01
Payer: MEDICARE

## 2024-03-01 VITALS
DIASTOLIC BLOOD PRESSURE: 76 MMHG | WEIGHT: 175 LBS | OXYGEN SATURATION: 99 % | BODY MASS INDEX: 27.47 KG/M2 | HEART RATE: 75 BPM | SYSTOLIC BLOOD PRESSURE: 151 MMHG | HEIGHT: 67 IN

## 2024-03-01 PROCEDURE — 99214 OFFICE O/P EST MOD 30 MIN: CPT

## 2024-03-01 PROCEDURE — 93000 ELECTROCARDIOGRAM COMPLETE: CPT

## 2024-03-01 PROCEDURE — G2211 COMPLEX E/M VISIT ADD ON: CPT

## 2024-03-01 RX ORDER — LOSARTAN POTASSIUM 25 MG/1
25 TABLET, FILM COATED ORAL DAILY
Qty: 90 | Refills: 3 | Status: DISCONTINUED | COMMUNITY
Start: 2022-08-12 | End: 2024-03-01

## 2024-03-01 NOTE — PHYSICAL EXAM
[Well Developed] : well developed [Well Nourished] : well nourished [No Acute Distress] : no acute distress [Normal Conjunctiva] : normal conjunctiva [Normal Venous Pressure] : normal venous pressure [Normal S1, S2] : normal S1, S2 [No Murmur] : no murmur [Clear Lung Fields] : clear lung fields [No Rub] : no rub [Good Air Entry] : good air entry [No Respiratory Distress] : no respiratory distress  [Soft] : abdomen soft [Normal Bowel Sounds] : normal bowel sounds [Normal Gait] : normal gait [No Edema] : no edema [No Cyanosis] : no cyanosis [Moves all extremities] : moves all extremities [No Rash] : no rash [No Focal Deficits] : no focal deficits [Normal Speech] : normal speech [Alert and Oriented] : alert and oriented [Normal memory] : normal memory [de-identified] : Irregular rhythm

## 2024-03-01 NOTE — DISCUSSION/SUMMARY
[FreeTextEntry1] : He is an 87-year-old with a history of chronic atrial fibrillation (20 yrs), hypertension who appears to be in good physical shape otherwise. Echo stable valve issues. repeat next year. exercise stress test showed no ischemia. HTN:  His blood pressure control remains inadequate. Will continue losartan (losartan 50) and [id number] Toprol to 100 mg qd. Prior uptitration led to orthostasis.  AF: Permanent. Your heart rate control with the higher dose of Toprol-XL. Continue Xarelto 20 mg once daily for stroke risk reduction. He may discontinue Xarelto 2 days prior to the planned dental intervention. LDL: 52 at goal. Continue statin.  No other changes to his medical regimen.   Blood pressure follow-up in 1 month with Pily. See me in 6 months. [EKG obtained to assist in diagnosis and management of assessed problem(s)] : EKG obtained to assist in diagnosis and management of assessed problem(s)

## 2024-03-01 NOTE — HISTORY OF PRESENT ILLNESS
[FreeTextEntry1] : Still walking 2 miles every few days. Some brief, dyspnea upstairs. Resolves quickly. Tinnitus. Dental surgery planned - post placement.  Prior: 500 crunches 60 pushups qod. Walks 2 miles without difficulty. No bleeding issues. No change to bruising when he gets injured. No falls. Dealing with a close family friend who passed away.  Prior: He is feeling well. Exercising by bike, pushups and crunches. No bleeding or bruising.  Prior: He was seen by his dentist and noted elevated diastolic pressure (taken with an automatic cuff). Feels well overall. Recent bronchitis. Now resolved with abx and steroids.  Prior: Bikes and golfs without difficulty. Crunches and sit ups. Reports Some "brain fog" since covid in July. Now tolerating losartan 75 qd. June 2022: Echo stable valve issues. repeat next year. exercise stress test showed no ischemia.  Prior: Feels well overall. Staying active, no bleeding or bruising issues. CBD improved his tinnitus. pins and needles at the end of the day in his feet. Some MACHUCA after walking uphill  Prior: No further headaches. Planning cataract surgery. Exercising routinely without difficulty. No lightheadedness or dizziness or palpitations.  Prior: Morning headeaches for the past 4 days. started xarelto 5 days ago. INR was 1.4 Metoprolol increased to 50 then back to 25. Toothaches and surgical treatment. no bleeding or bruising.   Dear Jeferson, Thank you for referring him for cardiovascular valuation and management of his cardiac issues.  He is a an 85-year-old with a history of chronic atrial fibrillation, hypercholesterolemia, hypertension, right bundle branch block and arthritis who is physically active and currently rides a motorcycle.  He is planning a 4-day by [employer] in the near future. He reports being physically active including being able to walk a couple miles without any chest pains or shortness of breath.  He does up to 500 crutches and 60 push-ups on a routine basis without difficulty. His past medical history is notable for thyroid cancer status post resection. He has no history of coronary artery disease, diabetes mellitus, smoking or family history of premature coronary artery disease. His wife is a patient of ours as well.

## 2024-03-01 NOTE — REVIEW OF SYSTEMS
[Negative] : Heme/Lymph [SOB] : no shortness of breath [Dyspnea on exertion] : not dyspnea during exertion [Chest Discomfort] : no chest discomfort [Syncope] : no syncope

## 2024-03-07 RX ORDER — ATORVASTATIN CALCIUM 20 MG/1
20 TABLET, FILM COATED ORAL
Qty: 90 | Refills: 3 | Status: ACTIVE | COMMUNITY
Start: 1900-01-01 | End: 1900-01-01

## 2024-03-12 ENCOUNTER — NON-APPOINTMENT (OUTPATIENT)
Age: 89
End: 2024-03-12

## 2024-03-15 NOTE — HISTORY OF PRESENT ILLNESS
[de-identified] : 87 yo M with gradual hearing loss AU and constant tinnitus AU for past 7 years presents for another opinion. Started wearing hearing aids over 10 years ago - hasnt worn in 3 years.  Saw ENT in [place] - unsure name - no recommendations. Went to [facility] about 3-4 years ago- thought related to be related to neck - never on muscle relaxants. Tinnitus worsens with neck flexion. No tinnitus, otalgia, otorrhea, ear infections, dizziness/vertigo or headaches related to hearing. Last audiogram over two years ago. No imaging.  No hx of head trauma. 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 Father with hearing loss in his 80s. 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   - +NIHL - worked in [employer] x 50 years - no vertigo

## 2024-03-15 NOTE — DATA REVIEWED
[de-identified] : Ad: Mild SNH rising to WNL 1-2kHz precipitously sloping to a moderately-severe to severe SNHL.  As: Mild SNHL rising to WNL 1-2kHz precipitously sloping to severe to profound SNHL.  Excellent WRS, AU. Type A tymps, AU. TInnitus pitch matched to 4kHz NBN at ~54 dB AD reference.

## 2024-03-26 ENCOUNTER — RX RENEWAL (OUTPATIENT)
Age: 89
End: 2024-03-26

## 2024-04-03 ENCOUNTER — APPOINTMENT (OUTPATIENT)
Dept: CARDIOLOGY | Facility: CLINIC | Age: 89
End: 2024-04-03
Payer: MEDICARE

## 2024-04-03 VITALS
BODY MASS INDEX: 27.47 KG/M2 | OXYGEN SATURATION: 98 % | SYSTOLIC BLOOD PRESSURE: 134 MMHG | WEIGHT: 175 LBS | DIASTOLIC BLOOD PRESSURE: 80 MMHG | HEIGHT: 67 IN | HEART RATE: 82 BPM

## 2024-04-03 DIAGNOSIS — I65.29 OCCLUSION AND STENOSIS OF UNSPECIFIED CAROTID ARTERY: ICD-10-CM

## 2024-04-03 DIAGNOSIS — I10 ESSENTIAL (PRIMARY) HYPERTENSION: ICD-10-CM

## 2024-04-03 DIAGNOSIS — I48.0 PAROXYSMAL ATRIAL FIBRILLATION: ICD-10-CM

## 2024-04-03 PROCEDURE — 99214 OFFICE O/P EST MOD 30 MIN: CPT

## 2024-04-03 RX ORDER — APIXABAN 5 MG/1
5 TABLET, FILM COATED ORAL
Qty: 180 | Refills: 1 | Status: ACTIVE | COMMUNITY
Start: 2024-04-03 | End: 1900-01-01

## 2024-04-04 ENCOUNTER — APPOINTMENT (OUTPATIENT)
Dept: OTOLARYNGOLOGY | Facility: CLINIC | Age: 89
End: 2024-04-04
Payer: MEDICARE

## 2024-04-04 VITALS — BODY MASS INDEX: 27.47 KG/M2 | HEIGHT: 67 IN | WEIGHT: 175 LBS

## 2024-04-04 DIAGNOSIS — H93.19 TINNITUS, UNSPECIFIED EAR: ICD-10-CM

## 2024-04-04 DIAGNOSIS — H90.3 SENSORINEURAL HEARING LOSS, BILATERAL: ICD-10-CM

## 2024-04-04 PROCEDURE — 99213 OFFICE O/P EST LOW 20 MIN: CPT

## 2024-04-04 NOTE — HISTORY OF PRESENT ILLNESS
[de-identified] : 88 year old man, 1 month follow up for bilateral tinnitus - pitch matches to region of hearing loss suggestive that it is cochlear in origin. History of asymmetrical hearing loss. Recommended for hearing aids - has NOT obtained.  Reports no change with hearing and tinnitus.  Reports mild dizziness with imbalance, progressing since last visit.  Patient denies otalgia, otorrhea, headaches related to hearing, recent fevers and ear infections.

## 2024-04-04 NOTE — REASON FOR VISIT
[Subsequent Evaluation] : a subsequent evaluation for [Spouse] : spouse [FreeTextEntry2] : bilateral tinnitus

## 2024-04-07 NOTE — DISCUSSION/SUMMARY
[FreeTextEntry1] : He is an 88-year-old with a history of chronic atrial fibrillation (20 yrs), hypertension who appears to be in good physical shape otherwise. Echo stable valve issues. Repeat next year. Exercise stress test showed no ischemia. HTN:  His blood pressure control is now improved. Will continue losartan 50 mg BID and Toprol 50 mg daily. Prior uptitration of beta blockade led to orthostasis.  AF: Permanent. Heart rate control with Toprol-XL. Continue Xarelto 20 mg once daily for stroke risk reduction. LDL: 52 at goal. Continue statin.  No other changes to his medical regimen.   He has been advised to remain hydrated, especially prior to exercise.   Follow up in 6 months.

## 2024-04-07 NOTE — PHYSICAL EXAM
[Well Developed] : well developed [Well Nourished] : well nourished [No Acute Distress] : no acute distress [Normal Conjunctiva] : normal conjunctiva [Normal Venous Pressure] : normal venous pressure [Normal S1, S2] : normal S1, S2 [No Murmur] : no murmur [No Rub] : no rub [Clear Lung Fields] : clear lung fields [Good Air Entry] : good air entry [No Respiratory Distress] : no respiratory distress  [Soft] : abdomen soft [Normal Bowel Sounds] : normal bowel sounds [No Edema] : no edema [Normal Gait] : normal gait [No Cyanosis] : no cyanosis [No Rash] : no rash [Moves all extremities] : moves all extremities [No Focal Deficits] : no focal deficits [Normal Speech] : normal speech [Alert and Oriented] : alert and oriented [Normal memory] : normal memory [de-identified] : Irregular rhythm

## 2024-04-07 NOTE — END OF VISIT
[FreeTextEntry3] : I saw the patient with Pily Gonzalez NP and I agree with the clinical findings, exam and assessment and plan as above.

## 2024-04-07 NOTE — HISTORY OF PRESENT ILLNESS
[FreeTextEntry1] : He feels pressure in his head when he bends over. Also feels unsteady when he goes down the stairs.  He has been taking 50 mg losartan BID. Metoprolol 50 mg daily.  For exercise he 300 crunches, 60 pushups QOD, bike x 40 minutes.  Will be heading to his [place] [facility] [place], he is very active while there.  Prior: Still walking 2 miles every few days. Some brief, dyspnea upstairs. Resolves quickly. Tinnitus. Dental surgery planned - post placement.  Prior: 500 crunches 60 pushups qod. Walks 2 miles without difficulty. No bleeding issues. No change to bruising when he gets injured. No falls. Dealing with a close family friend who passed away.  Prior: He is feeling well. Exercising by bike, pushups and crunches. No bleeding or bruising.  Prior: He was seen by his dentist and noted elevated diastolic pressure (taken with an automatic cuff). Feels well overall. Recent bronchitis. Now resolved with abx and steroids.  Prior: Bikes and golfs without difficulty. Crunches and sit ups. Reports Some "brain fog" since covid in July. Now tolerating losartan 75 qd. June 2022: Echo stable valve issues. repeat next year. exercise stress test showed no ischemia.  Prior: Feels well overall. Staying active, no bleeding or bruising issues. CBD improved his tinnitus. pins and needles at the end of the day in his feet. Some MACHUCA after walking uphill  Prior: No further headaches. Planning cataract surgery. Exercising routinely without difficulty. No lightheadedness or dizziness or palpitations.  Prior: Morning headaches for the past 4 days. started xarelto 5 days ago. INR was 1.4 Metoprolol increased to 50 then back to 25. Toothaches and surgical treatment. no bleeding or bruising.   Dear Jeferson, Thank you for referring him for cardiovascular valuation and management of his cardiac issues.  He is a an 85-year-old with a history of chronic atrial fibrillation, hypercholesterolemia, hypertension, right bundle branch block and arthritis who is physically active and currently rides a motorcycle.  He is planning a 4-day by [facility] in the near future. He reports being physically active including being able to walk a couple miles without any chest pains or shortness of breath.  He does up to 500 crutches and 60 push-ups on a routine basis without difficulty. His past medical history is notable for thyroid cancer status post resection. He has no history of coronary artery disease, diabetes mellitus, smoking or family history of premature coronary artery disease. His wife is a patient of ours as well.

## 2024-04-11 RX ORDER — RIVAROXABAN 20 MG/1
20 TABLET, FILM COATED ORAL
Qty: 90 | Refills: 3 | Status: ACTIVE | COMMUNITY
Start: 2021-08-13 | End: 1900-01-01

## 2024-05-24 RX ORDER — LOSARTAN POTASSIUM 50 MG/1
50 TABLET, FILM COATED ORAL TWICE DAILY
Qty: 180 | Refills: 1 | Status: ACTIVE | COMMUNITY
Start: 2019-04-08 | End: 1900-01-01

## 2024-06-20 ENCOUNTER — RX RENEWAL (OUTPATIENT)
Age: 89
End: 2024-06-20

## 2024-06-20 RX ORDER — METOPROLOL SUCCINATE 50 MG/1
50 TABLET, EXTENDED RELEASE ORAL
Qty: 90 | Refills: 3 | Status: ACTIVE | COMMUNITY
Start: 2022-05-05 | End: 1900-01-01

## 2024-07-03 ENCOUNTER — APPOINTMENT (OUTPATIENT)
Dept: OPHTHALMOLOGY | Facility: CLINIC | Age: 89
End: 2024-07-03
Payer: MEDICARE

## 2024-07-03 ENCOUNTER — NON-APPOINTMENT (OUTPATIENT)
Age: 89
End: 2024-07-03

## 2024-07-03 PROCEDURE — 76514 ECHO EXAM OF EYE THICKNESS: CPT

## 2024-07-03 PROCEDURE — 92014 COMPRE OPH EXAM EST PT 1/>: CPT

## 2024-08-20 ENCOUNTER — APPOINTMENT (OUTPATIENT)
Dept: INTERNAL MEDICINE | Facility: CLINIC | Age: 89
End: 2024-08-20
Payer: MEDICARE

## 2024-08-20 VITALS
BODY MASS INDEX: 27.47 KG/M2 | OXYGEN SATURATION: 99 % | HEIGHT: 67 IN | WEIGHT: 175 LBS | HEART RATE: 84 BPM | TEMPERATURE: 98.1 F

## 2024-08-20 VITALS — SYSTOLIC BLOOD PRESSURE: 130 MMHG | DIASTOLIC BLOOD PRESSURE: 80 MMHG

## 2024-08-20 DIAGNOSIS — I48.0 PAROXYSMAL ATRIAL FIBRILLATION: ICD-10-CM

## 2024-08-20 DIAGNOSIS — I10 ESSENTIAL (PRIMARY) HYPERTENSION: ICD-10-CM

## 2024-08-20 DIAGNOSIS — Z01.810 ENCOUNTER FOR PREPROCEDURAL CARDIOVASCULAR EXAMINATION: ICD-10-CM

## 2024-08-20 DIAGNOSIS — Z86.39 PERSONAL HISTORY OF OTHER ENDOCRINE, NUTRITIONAL AND METABOLIC DISEASE: ICD-10-CM

## 2024-08-20 DIAGNOSIS — H93.19 TINNITUS, UNSPECIFIED EAR: ICD-10-CM

## 2024-08-20 DIAGNOSIS — I65.29 OCCLUSION AND STENOSIS OF UNSPECIFIED CAROTID ARTERY: ICD-10-CM

## 2024-08-20 DIAGNOSIS — Z00.00 ENCOUNTER FOR GENERAL ADULT MEDICAL EXAMINATION W/OUT ABNORMAL FINDINGS: ICD-10-CM

## 2024-08-20 PROCEDURE — 36415 COLL VENOUS BLD VENIPUNCTURE: CPT

## 2024-08-20 PROCEDURE — 82270 OCCULT BLOOD FECES: CPT

## 2024-08-20 PROCEDURE — G0439: CPT

## 2024-08-20 NOTE — ASSESSMENT
[FreeTextEntry1] : The BP is fine at 130/80.  Discussed diet and exercise.  Check lipids on Atorvastatin.  He saw his cardiologist recently.  He sees his endocrinologist for the thyroid- Dr. Murillo.  TFTs checked there.  S/p current COVID-19 vaccine, Prevnar 13, Pneumovax.  Shingrix and new COVID-19 vaccine advised.  He sees an ophthalmologist.    Alprazolam PRN renewed.

## 2024-08-20 NOTE — HEALTH RISK ASSESSMENT
[No falls in past year] : Patient reported no falls in the past year [0] : 2) Feeling down, depressed, or hopeless: Not at all (0) [Fully functional (bathing, dressing, toileting, transferring, walking, feeding)] : Fully functional (bathing, dressing, toileting, transferring, walking, feeding) [Fully functional (using the telephone, shopping, preparing meals, housekeeping, doing laundry, using] : Fully functional and needs no help or supervision to perform IADLs (using the telephone, shopping, preparing meals, housekeeping, doing laundry, using transportation, managing medications and managing finances) [[id number]] : 0 [Reports changes in hearing] : Reports no changes in hearing [Reports changes in vision] : Reports no changes in vision [Reports changes in dental health] : Reports no changes in dental health

## 2024-08-20 NOTE — HISTORY OF PRESENT ILLNESS
[FreeTextEntry1] : The patient is here for a routine visit.  [de-identified] : He feels well.  He exercises regularly.  No chest pain or dyspnea. His diet is ok.    He has occasional insomnia.  No change in the chronic tinnitus.

## 2024-08-21 LAB
ALBUMIN SERPL ELPH-MCNC: 4.5 G/DL
ALP BLD-CCNC: 93 U/L
ALT SERPL-CCNC: 19 U/L
ANION GAP SERPL CALC-SCNC: 13 MMOL/L
APPEARANCE: CLEAR
AST SERPL-CCNC: 26 U/L
BACTERIA: NEGATIVE /HPF
BILIRUB SERPL-MCNC: 0.8 MG/DL
BILIRUBIN URINE: NEGATIVE
BLOOD URINE: NEGATIVE
BUN SERPL-MCNC: 22 MG/DL
CALCIUM OXALATE CRYSTALS: PRESENT
CALCIUM SERPL-MCNC: 9.4 MG/DL
CAST: 0 /LPF
CHLORIDE SERPL-SCNC: 104 MMOL/L
CHOLEST SERPL-MCNC: 112 MG/DL
CO2 SERPL-SCNC: 25 MMOL/L
COLOR: YELLOW
CREAT SERPL-MCNC: 1.21 MG/DL
EGFR: 58 ML/MIN/1.73M2
EPITHELIAL CELLS: 0 /HPF
ESTIMATED AVERAGE GLUCOSE: 120 MG/DL
GLUCOSE QUALITATIVE U: NEGATIVE MG/DL
GLUCOSE SERPL-MCNC: 110 MG/DL
HBA1C MFR BLD HPLC: 5.8 %
HCT VFR BLD CALC: 45.9 %
HDLC SERPL-MCNC: 43 MG/DL
HGB BLD-MCNC: 14.7 G/DL
KETONES URINE: NEGATIVE MG/DL
LDLC SERPL CALC-MCNC: 52 MG/DL
LEUKOCYTE ESTERASE URINE: NEGATIVE
MCHC RBC-ENTMCNC: 31.3 PG
MCHC RBC-ENTMCNC: 32 GM/DL
MCV RBC AUTO: 97.7 FL
MICROSCOPIC-UA: NORMAL
NITRITE URINE: NEGATIVE
NONHDLC SERPL-MCNC: 69 MG/DL
PH URINE: 6
PLATELET # BLD AUTO: 112 K/UL
POTASSIUM SERPL-SCNC: 4.1 MMOL/L
PROT SERPL-MCNC: 6.8 G/DL
PROTEIN URINE: NORMAL MG/DL
RBC # BLD: 4.7 M/UL
RBC # FLD: 14.6 %
RED BLOOD CELLS URINE: 3 /HPF
REVIEW: NORMAL
SODIUM SERPL-SCNC: 142 MMOL/L
SPECIFIC GRAVITY URINE: 1.02
TRIGL SERPL-MCNC: 84 MG/DL
UROBILINOGEN URINE: 0.2 MG/DL
WBC # FLD AUTO: 7.2 K/UL
WHITE BLOOD CELLS URINE: 0 /HPF

## 2024-10-10 ENCOUNTER — APPOINTMENT (OUTPATIENT)
Dept: CARDIOLOGY | Facility: CLINIC | Age: 89
End: 2024-10-10
Payer: MEDICARE

## 2024-10-10 ENCOUNTER — NON-APPOINTMENT (OUTPATIENT)
Age: 89
End: 2024-10-10

## 2024-10-10 VITALS
OXYGEN SATURATION: 97 % | DIASTOLIC BLOOD PRESSURE: 78 MMHG | HEART RATE: 87 BPM | SYSTOLIC BLOOD PRESSURE: 130 MMHG | WEIGHT: 175 LBS | BODY MASS INDEX: 27.41 KG/M2

## 2024-10-10 DIAGNOSIS — I48.0 PAROXYSMAL ATRIAL FIBRILLATION: ICD-10-CM

## 2024-10-10 DIAGNOSIS — I10 ESSENTIAL (PRIMARY) HYPERTENSION: ICD-10-CM

## 2024-10-10 PROCEDURE — 93000 ELECTROCARDIOGRAM COMPLETE: CPT

## 2024-10-10 PROCEDURE — 99214 OFFICE O/P EST MOD 30 MIN: CPT

## 2024-11-15 ENCOUNTER — RX RENEWAL (OUTPATIENT)
Age: 89
End: 2024-11-15

## 2025-02-20 ENCOUNTER — RX RENEWAL (OUTPATIENT)
Age: 89
End: 2025-02-20

## 2025-04-22 ENCOUNTER — RX RENEWAL (OUTPATIENT)
Age: 89
End: 2025-04-22

## 2025-05-13 ENCOUNTER — NON-APPOINTMENT (OUTPATIENT)
Age: 89
End: 2025-05-13

## 2025-05-14 ENCOUNTER — APPOINTMENT (OUTPATIENT)
Dept: CARDIOLOGY | Facility: CLINIC | Age: 89
End: 2025-05-14
Payer: MEDICARE

## 2025-05-14 ENCOUNTER — NON-APPOINTMENT (OUTPATIENT)
Age: 89
End: 2025-05-14

## 2025-05-14 VITALS
BODY MASS INDEX: 27.41 KG/M2 | SYSTOLIC BLOOD PRESSURE: 125 MMHG | OXYGEN SATURATION: 96 % | DIASTOLIC BLOOD PRESSURE: 65 MMHG | WEIGHT: 175 LBS | HEART RATE: 79 BPM

## 2025-05-14 DIAGNOSIS — I48.0 PAROXYSMAL ATRIAL FIBRILLATION: ICD-10-CM

## 2025-05-14 DIAGNOSIS — I35.0 NONRHEUMATIC AORTIC (VALVE) STENOSIS: ICD-10-CM

## 2025-05-14 DIAGNOSIS — I10 ESSENTIAL (PRIMARY) HYPERTENSION: ICD-10-CM

## 2025-05-14 PROCEDURE — 93000 ELECTROCARDIOGRAM COMPLETE: CPT

## 2025-05-14 PROCEDURE — 99214 OFFICE O/P EST MOD 30 MIN: CPT

## 2025-05-14 PROCEDURE — G2211 COMPLEX E/M VISIT ADD ON: CPT

## 2025-06-25 ENCOUNTER — NON-APPOINTMENT (OUTPATIENT)
Age: 89
End: 2025-06-25

## 2025-06-25 ENCOUNTER — APPOINTMENT (OUTPATIENT)
Dept: OPHTHALMOLOGY | Facility: CLINIC | Age: 89
End: 2025-06-25
Payer: MEDICARE

## 2025-06-25 PROCEDURE — 92014 COMPRE OPH EXAM EST PT 1/>: CPT

## 2025-06-25 PROCEDURE — 76514 ECHO EXAM OF EYE THICKNESS: CPT

## 2025-08-01 ENCOUNTER — NON-APPOINTMENT (OUTPATIENT)
Age: 89
End: 2025-08-01

## 2025-08-01 ENCOUNTER — APPOINTMENT (OUTPATIENT)
Age: 89
End: 2025-08-01
Payer: MEDICARE

## 2025-08-01 PROCEDURE — 92250 FUNDUS PHOTOGRAPHY W/I&R: CPT

## 2025-08-01 PROCEDURE — 92012 INTRM OPH EXAM EST PATIENT: CPT

## 2025-08-26 ENCOUNTER — APPOINTMENT (OUTPATIENT)
Dept: INTERNAL MEDICINE | Facility: CLINIC | Age: 89
End: 2025-08-26
Payer: MEDICARE

## 2025-08-26 VITALS
OXYGEN SATURATION: 99 % | BODY MASS INDEX: 27.47 KG/M2 | HEIGHT: 67 IN | TEMPERATURE: 98.1 F | HEART RATE: 70 BPM | WEIGHT: 175 LBS

## 2025-08-26 VITALS — SYSTOLIC BLOOD PRESSURE: 130 MMHG | DIASTOLIC BLOOD PRESSURE: 80 MMHG

## 2025-08-26 DIAGNOSIS — Z86.79 PERSONAL HISTORY OF OTHER DISEASES OF THE CIRCULATORY SYSTEM: ICD-10-CM

## 2025-08-26 DIAGNOSIS — I10 ESSENTIAL (PRIMARY) HYPERTENSION: ICD-10-CM

## 2025-08-26 DIAGNOSIS — Z00.00 ENCOUNTER FOR GENERAL ADULT MEDICAL EXAMINATION W/OUT ABNORMAL FINDINGS: ICD-10-CM

## 2025-08-26 DIAGNOSIS — I48.0 PAROXYSMAL ATRIAL FIBRILLATION: ICD-10-CM

## 2025-08-26 DIAGNOSIS — H93.19 TINNITUS, UNSPECIFIED EAR: ICD-10-CM

## 2025-08-26 DIAGNOSIS — Z86.39 PERSONAL HISTORY OF OTHER ENDOCRINE, NUTRITIONAL AND METABOLIC DISEASE: ICD-10-CM

## 2025-08-26 DIAGNOSIS — I35.0 NONRHEUMATIC AORTIC (VALVE) STENOSIS: ICD-10-CM

## 2025-08-26 PROCEDURE — G0439: CPT

## 2025-08-26 PROCEDURE — 36415 COLL VENOUS BLD VENIPUNCTURE: CPT

## 2025-08-27 LAB
25(OH)D3 SERPL-MCNC: 69.8 NG/ML
ALBUMIN SERPL ELPH-MCNC: 4.5 G/DL
ALP BLD-CCNC: 109 U/L
ALT SERPL-CCNC: 30 U/L
ANION GAP SERPL CALC-SCNC: 15 MMOL/L
APPEARANCE: CLEAR
AST SERPL-CCNC: 31 U/L
BACTERIA: NEGATIVE /HPF
BASOPHILS # BLD AUTO: 0.03 K/UL
BASOPHILS NFR BLD AUTO: 0.3 %
BILIRUB SERPL-MCNC: 1.2 MG/DL
BILIRUBIN URINE: NEGATIVE
BLOOD URINE: ABNORMAL
BUN SERPL-MCNC: 20 MG/DL
CALCIUM SERPL-MCNC: 10.5 MG/DL
CAST: 0 /LPF
CHLORIDE SERPL-SCNC: 102 MMOL/L
CHOLEST SERPL-MCNC: 117 MG/DL
CO2 SERPL-SCNC: 26 MMOL/L
COLOR: YELLOW
CREAT SERPL-MCNC: 1.22 MG/DL
EGFRCR SERPLBLD CKD-EPI 2021: 57 ML/MIN/1.73M2
EOSINOPHIL # BLD AUTO: 0.18 K/UL
EOSINOPHIL NFR BLD AUTO: 2.1 %
EPITHELIAL CELLS: 0 /HPF
ESTIMATED AVERAGE GLUCOSE: 123 MG/DL
GLUCOSE QUALITATIVE U: NEGATIVE MG/DL
GLUCOSE SERPL-MCNC: 138 MG/DL
HBA1C MFR BLD HPLC: 5.9 %
HCT VFR BLD CALC: 46 %
HDLC SERPL-MCNC: 42 MG/DL
HGB BLD-MCNC: 14.7 G/DL
IMM GRANULOCYTES NFR BLD AUTO: 0.2 %
KETONES URINE: NEGATIVE MG/DL
LDLC SERPL-MCNC: 58 MG/DL
LEUKOCYTE ESTERASE URINE: NEGATIVE
LYMPHOCYTES # BLD AUTO: 2.96 K/UL
LYMPHOCYTES NFR BLD AUTO: 34 %
MAN DIFF?: NORMAL
MCHC RBC-ENTMCNC: 30.4 PG
MCHC RBC-ENTMCNC: 32 G/DL
MCV RBC AUTO: 95 FL
MICROSCOPIC-UA: NORMAL
MONOCYTES # BLD AUTO: 0.5 K/UL
MONOCYTES NFR BLD AUTO: 5.7 %
NEUTROPHILS # BLD AUTO: 5.01 K/UL
NEUTROPHILS NFR BLD AUTO: 57.7 %
NITRITE URINE: NEGATIVE
NONHDLC SERPL-MCNC: 76 MG/DL
PH URINE: 6
PLATELET # BLD AUTO: 110 K/UL
POTASSIUM SERPL-SCNC: 4 MMOL/L
PROT SERPL-MCNC: 7.3 G/DL
PROTEIN URINE: NORMAL MG/DL
PSA SERPL-MCNC: 1.12 NG/ML
RBC # BLD: 4.84 M/UL
RBC # FLD: 14.6 %
RED BLOOD CELLS URINE: 10 /HPF
SODIUM SERPL-SCNC: 143 MMOL/L
SPECIFIC GRAVITY URINE: 1.02
T4 FREE SERPL-MCNC: 1.7 NG/DL
TRIGL SERPL-MCNC: 91 MG/DL
TSH SERPL-ACNC: 0.28 UIU/ML
UROBILINOGEN URINE: 0.2 MG/DL
WBC # FLD AUTO: 8.7 K/UL
WHITE BLOOD CELLS URINE: 0 /HPF